# Patient Record
Sex: FEMALE | Race: OTHER
[De-identification: names, ages, dates, MRNs, and addresses within clinical notes are randomized per-mention and may not be internally consistent; named-entity substitution may affect disease eponyms.]

---

## 2017-07-12 ENCOUNTER — HOSPITAL ENCOUNTER (OUTPATIENT)
Dept: HOSPITAL 47 - RADMAMWWP | Age: 67
Discharge: HOME | End: 2017-07-12
Payer: MEDICARE

## 2017-07-12 DIAGNOSIS — Z12.31: Primary | ICD-10-CM

## 2017-07-12 DIAGNOSIS — R92.2: ICD-10-CM

## 2017-07-12 PROCEDURE — 77063 BREAST TOMOSYNTHESIS BI: CPT

## 2017-07-17 NOTE — MM
Reason for exam: additional evaluation requested from abnormal screening.

Last mammogram was performed 6 years ago.



History:

Patient is postmenopausal and is nulliparous.



Physical Findings:

Nurse did not find any significant physical abnormalities on exam.



MG 3D Screening Mammo W/Cad

Bilateral CC and MLO view(s) were taken.

Prior study comparison: July 27, 2011, mammogram, performed at [facility].

There are scattered fibroglandular densities.  There is chronic nodularity 

bilaterally. New calcifications associated with a nodule on each side on the right

posteriorly and centrally and on the left at 2o'clock posteriorly. Early popcorn 

or other benign type calcifications are favored.





ASSESSMENT: Incomplete: need additional imaging evaluation, BI-RAD 0



RECOMMENDATION:

Special view mammogram of both breasts.



If lesion persists on supplemental views, image directed ultrasound is 

recommended.



[facility] will attempt to contact patient to return for supplemental 

views and ultrasound if indicated.

## 2017-08-02 ENCOUNTER — HOSPITAL ENCOUNTER (OUTPATIENT)
Dept: HOSPITAL 47 - RADMAMWWP | Age: 67
Discharge: HOME | End: 2017-08-02
Payer: OTHER GOVERNMENT

## 2017-08-02 DIAGNOSIS — R92.8: Primary | ICD-10-CM

## 2017-08-02 NOTE — MM
Reason for exam: additional evaluation requested from abnormal screening.

Last mammogram was performed 1 month ago.



History:

Patient is postmenopausal and is nulliparous.



Physical Findings:

Nurse did not find any significant physical abnormalities on exam.



MG Work Up Mamm w CAD BILAT

Bilateral ML, CC with magnification, and ML with magnification view(s) were taken.

LM and LM with magnification view(s) were taken of the right breast.

Prior study comparison: July 12, 2017, bilateral MG 3d screening mammo w/cad.  

July 27, 2011, mammogram, performed at [facility].

The breast tissue is heterogeneously dense. This may lower the sensitivity of 

mammography.  The left breast calcifications layer on true lateral compatable with

benign milk of calcium. The right breast calcifications in question are similar 

to other calcifications through out the breast, rounded and probably benign. A 6 

month follow up is recommended.



These results were verbally communicated with the patient and result sheet given 

to the patient on 08/02/17.





ASSESSMENT: Probably benign, BI-RAD 3



RECOMMENDATION:

Follow-up diagnostic mammogram of the right breast in 6 months.

## 2018-05-07 ENCOUNTER — HOSPITAL ENCOUNTER (OUTPATIENT)
Dept: HOSPITAL 47 - RADMAMWWP | Age: 68
Discharge: HOME | End: 2018-05-07
Payer: OTHER GOVERNMENT

## 2018-05-07 DIAGNOSIS — R92.1: Primary | ICD-10-CM

## 2018-05-07 PROCEDURE — 77065 DX MAMMO INCL CAD UNI: CPT

## 2019-07-01 ENCOUNTER — HOSPITAL ENCOUNTER (OUTPATIENT)
Dept: HOSPITAL 47 - RADMAMWWP | Age: 69
Discharge: HOME | End: 2019-07-01
Attending: CLINIC/CENTER
Payer: COMMERCIAL

## 2019-07-01 DIAGNOSIS — R92.1: Primary | ICD-10-CM

## 2019-07-01 PROCEDURE — 77066 DX MAMMO INCL CAD BI: CPT

## 2019-07-01 NOTE — MM
Reason for exam: additional evaluation requested from prior study.

Last mammogram was performed 1 year and 2 months ago.



History:

Patient is postmenopausal and is nulliparous.



Physical Findings:

Nurse did not find any significant physical abnormalities on exam.



MG Diagnostic Mammo w CAD MARVA

Bilateral CC and MLO view(s) were taken.

Prior study comparison: May 7, 2018, right breast MG diagnostic mammo RT w CAD.  

August 2, 2017, bilateral MG work up mamm w CAD BILAT.

The breast tissue is heterogeneously dense. This may lower the sensitivity of 

mammography.  Benign appearing stable bilateral calcifications. There is chronic 

nodularity bilaterally.

No significant new findings when compared with previous films.



These results were verbally communicated with the patient and result sheet given 

to the patient on 7/1/19.





ASSESSMENT: Benign, BI-RAD 2



RECOMMENDATION:

Routine screening mammogram of both breasts in 1 year.

## 2020-07-14 ENCOUNTER — HOSPITAL ENCOUNTER (OUTPATIENT)
Dept: HOSPITAL 47 - RADMAMWWP | Age: 70
Discharge: HOME | End: 2020-07-14
Attending: CLINIC/CENTER
Payer: OTHER GOVERNMENT

## 2020-07-14 DIAGNOSIS — Z12.31: Primary | ICD-10-CM

## 2020-07-14 PROCEDURE — 77063 BREAST TOMOSYNTHESIS BI: CPT

## 2020-07-14 PROCEDURE — 77067 SCR MAMMO BI INCL CAD: CPT

## 2020-07-15 NOTE — MM
Reason for exam: screening  (asymptomatic).

Last mammogram was performed 1 year ago.



History:

Patient is postmenopausal and is nulliparous.



Physical Findings:

A clinical breast exam by your physician is recommended on an annual basis and 

results should be correlated with mammographic findings.



MG 3D Screening Mammo W/Cad

Bilateral CC and MLO view(s) were taken.

Prior study comparison: July 1, 2019, bilateral MG diagnostic mammo w CAD MARVA.  

May 7, 2018, right breast MG diagnostic mammo RT w CAD.

There are scattered fibroglandular densities.  There is chronic nodularity 

bilaterally.  No significant changes when compared with prior studies.





ASSESSMENT: Benign, BI-RAD 2



RECOMMENDATION:

Routine screening mammogram of both breasts in 1 year.

## 2021-07-16 ENCOUNTER — HOSPITAL ENCOUNTER (OUTPATIENT)
Dept: HOSPITAL 47 - RADMAMWWP | Age: 71
Discharge: HOME | End: 2021-07-16
Attending: FAMILY MEDICINE
Payer: OTHER GOVERNMENT

## 2021-07-16 DIAGNOSIS — Z12.31: Primary | ICD-10-CM

## 2021-07-16 DIAGNOSIS — Z78.0: ICD-10-CM

## 2021-07-16 PROCEDURE — 77067 SCR MAMMO BI INCL CAD: CPT

## 2021-07-16 PROCEDURE — 77063 BREAST TOMOSYNTHESIS BI: CPT

## 2021-07-20 NOTE — MM
Reason for exam: screening  (asymptomatic).

Last mammogram was performed 1 year ago.



History:

Patient is postmenopausal and is nulliparous.



Physical Findings:

A clinical breast exam by your physician is recommended on an annual basis and 

results should be correlated with mammographic findings.



MG 3D Screening Mammo W/Cad

Bilateral CC and MLO view(s) were taken.  XCCL view(s) were taken of the right 

breast.

Prior study comparison: July 14, 2020, bilateral MG 3d screening mammo w/cad.  

July 1, 2019, bilateral MG diagnostic mammo w CAD MARVA.

There are scattered fibroglandular densities.  Stable calcifications. There is 

chronic nodularity bilaterally.  No significant changes when compared with prior 

studies.





ASSESSMENT: Benign, BI-RAD 2



RECOMMENDATION:

Routine screening mammogram of both breasts in 1 year.

## 2022-02-16 ENCOUNTER — HOSPITAL ENCOUNTER (INPATIENT)
Dept: HOSPITAL 47 - EC | Age: 72
LOS: 6 days | Discharge: HOME | DRG: 190 | End: 2022-02-22
Attending: HOSPITALIST | Admitting: HOSPITALIST
Payer: OTHER GOVERNMENT

## 2022-02-16 DIAGNOSIS — K21.9: ICD-10-CM

## 2022-02-16 DIAGNOSIS — J44.1: Primary | ICD-10-CM

## 2022-02-16 DIAGNOSIS — Z79.83: ICD-10-CM

## 2022-02-16 DIAGNOSIS — Z87.39: ICD-10-CM

## 2022-02-16 DIAGNOSIS — Z79.51: ICD-10-CM

## 2022-02-16 DIAGNOSIS — Z20.822: ICD-10-CM

## 2022-02-16 DIAGNOSIS — I11.9: ICD-10-CM

## 2022-02-16 DIAGNOSIS — H40.9: ICD-10-CM

## 2022-02-16 DIAGNOSIS — Z98.890: ICD-10-CM

## 2022-02-16 DIAGNOSIS — Z90.49: ICD-10-CM

## 2022-02-16 DIAGNOSIS — E87.1: ICD-10-CM

## 2022-02-16 DIAGNOSIS — Z96.82: ICD-10-CM

## 2022-02-16 DIAGNOSIS — Z88.0: ICD-10-CM

## 2022-02-16 DIAGNOSIS — Z87.42: ICD-10-CM

## 2022-02-16 DIAGNOSIS — Z79.899: ICD-10-CM

## 2022-02-16 DIAGNOSIS — J96.21: ICD-10-CM

## 2022-02-16 DIAGNOSIS — Z87.440: ICD-10-CM

## 2022-02-16 DIAGNOSIS — M81.0: ICD-10-CM

## 2022-02-16 DIAGNOSIS — Z86.39: ICD-10-CM

## 2022-02-16 DIAGNOSIS — F32.A: ICD-10-CM

## 2022-02-16 DIAGNOSIS — I24.8: ICD-10-CM

## 2022-02-16 DIAGNOSIS — R32: ICD-10-CM

## 2022-02-16 DIAGNOSIS — G47.33: ICD-10-CM

## 2022-02-16 DIAGNOSIS — Z87.19: ICD-10-CM

## 2022-02-16 DIAGNOSIS — F41.9: ICD-10-CM

## 2022-02-16 DIAGNOSIS — I45.2: ICD-10-CM

## 2022-02-16 DIAGNOSIS — Z87.01: ICD-10-CM

## 2022-02-16 DIAGNOSIS — E66.01: ICD-10-CM

## 2022-02-16 DIAGNOSIS — E78.5: ICD-10-CM

## 2022-02-16 DIAGNOSIS — R33.9: ICD-10-CM

## 2022-02-16 DIAGNOSIS — Z88.8: ICD-10-CM

## 2022-02-16 DIAGNOSIS — Z90.710: ICD-10-CM

## 2022-02-16 DIAGNOSIS — R77.8: ICD-10-CM

## 2022-02-16 DIAGNOSIS — Z91.19: ICD-10-CM

## 2022-02-16 LAB
ALBUMIN SERPL-MCNC: 3.8 G/DL (ref 3.5–5)
ALP SERPL-CCNC: 103 U/L (ref 38–126)
ALT SERPL-CCNC: 15 U/L (ref 4–34)
ANION GAP SERPL CALC-SCNC: 10 MMOL/L
AST SERPL-CCNC: 24 U/L (ref 14–36)
BASOPHILS # BLD AUTO: 0.1 K/UL (ref 0–0.2)
BASOPHILS NFR BLD AUTO: 0 %
BUN SERPL-SCNC: 14 MG/DL (ref 7–17)
CALCIUM SPEC-MCNC: 8.8 MG/DL (ref 8.4–10.2)
CHLORIDE SERPL-SCNC: 95 MMOL/L (ref 98–107)
CO2 SERPL-SCNC: 24 MMOL/L (ref 22–30)
EOSINOPHIL # BLD AUTO: 0.1 K/UL (ref 0–0.7)
EOSINOPHIL NFR BLD AUTO: 1 %
ERYTHROCYTE [DISTWIDTH] IN BLOOD BY AUTOMATED COUNT: 4.52 M/UL (ref 3.8–5.4)
ERYTHROCYTE [DISTWIDTH] IN BLOOD: 14.4 % (ref 11.5–15.5)
GLUCOSE BLD-MCNC: 172 MG/DL (ref 75–99)
GLUCOSE BLD-MCNC: 214 MG/DL (ref 75–99)
GLUCOSE SERPL-MCNC: 175 MG/DL (ref 74–99)
HCT VFR BLD AUTO: 42.2 % (ref 34–46)
HGB BLD-MCNC: 14 GM/DL (ref 11.4–16)
LYMPHOCYTES # SPEC AUTO: 0.9 K/UL (ref 1–4.8)
LYMPHOCYTES NFR SPEC AUTO: 6 %
MCH RBC QN AUTO: 31 PG (ref 25–35)
MCHC RBC AUTO-ENTMCNC: 33.2 G/DL (ref 31–37)
MCV RBC AUTO: 93.4 FL (ref 80–100)
MONOCYTES # BLD AUTO: 0.4 K/UL (ref 0–1)
MONOCYTES NFR BLD AUTO: 3 %
NEUTROPHILS # BLD AUTO: 14.2 K/UL (ref 1.3–7.7)
NEUTROPHILS NFR BLD AUTO: 90 %
PLATELET # BLD AUTO: 245 K/UL (ref 150–450)
POTASSIUM SERPL-SCNC: 3.5 MMOL/L (ref 3.5–5.1)
PROT SERPL-MCNC: 6.9 G/DL (ref 6.3–8.2)
SODIUM SERPL-SCNC: 129 MMOL/L (ref 137–145)
WBC # BLD AUTO: 15.8 K/UL (ref 3.8–10.6)

## 2022-02-16 PROCEDURE — 96375 TX/PRO/DX INJ NEW DRUG ADDON: CPT

## 2022-02-16 PROCEDURE — 80061 LIPID PANEL: CPT

## 2022-02-16 PROCEDURE — 74018 RADEX ABDOMEN 1 VIEW: CPT

## 2022-02-16 PROCEDURE — 87635 SARS-COV-2 COVID-19 AMP PRB: CPT

## 2022-02-16 PROCEDURE — 84145 PROCALCITONIN (PCT): CPT

## 2022-02-16 PROCEDURE — 83036 HEMOGLOBIN GLYCOSYLATED A1C: CPT

## 2022-02-16 PROCEDURE — 71045 X-RAY EXAM CHEST 1 VIEW: CPT

## 2022-02-16 PROCEDURE — 83880 ASSAY OF NATRIURETIC PEPTIDE: CPT

## 2022-02-16 PROCEDURE — 83735 ASSAY OF MAGNESIUM: CPT

## 2022-02-16 PROCEDURE — 83605 ASSAY OF LACTIC ACID: CPT

## 2022-02-16 PROCEDURE — 94760 N-INVAS EAR/PLS OXIMETRY 1: CPT

## 2022-02-16 PROCEDURE — 80048 BASIC METABOLIC PNL TOTAL CA: CPT

## 2022-02-16 PROCEDURE — 93005 ELECTROCARDIOGRAM TRACING: CPT

## 2022-02-16 PROCEDURE — 84484 ASSAY OF TROPONIN QUANT: CPT

## 2022-02-16 PROCEDURE — 36415 COLL VENOUS BLD VENIPUNCTURE: CPT

## 2022-02-16 PROCEDURE — 80053 COMPREHEN METABOLIC PANEL: CPT

## 2022-02-16 PROCEDURE — 96374 THER/PROPH/DIAG INJ IV PUSH: CPT

## 2022-02-16 PROCEDURE — 71275 CT ANGIOGRAPHY CHEST: CPT

## 2022-02-16 PROCEDURE — 96372 THER/PROPH/DIAG INJ SC/IM: CPT

## 2022-02-16 PROCEDURE — 94660 CPAP INITIATION&MGMT: CPT

## 2022-02-16 PROCEDURE — 94640 AIRWAY INHALATION TREATMENT: CPT

## 2022-02-16 PROCEDURE — 99285 EMERGENCY DEPT VISIT HI MDM: CPT

## 2022-02-16 PROCEDURE — 85025 COMPLETE CBC W/AUTO DIFF WBC: CPT

## 2022-02-16 PROCEDURE — 93306 TTE W/DOPPLER COMPLETE: CPT

## 2022-02-16 PROCEDURE — 5A09357 ASSISTANCE WITH RESPIRATORY VENTILATION, LESS THAN 24 CONSECUTIVE HOURS, CONTINUOUS POSITIVE AIRWAY PRESSURE: ICD-10-PCS

## 2022-02-16 RX ADMIN — ISODIUM CHLORIDE SCH MG: 0.03 SOLUTION RESPIRATORY (INHALATION) at 15:31

## 2022-02-16 RX ADMIN — METHYLPREDNISOLONE SODIUM SUCCINATE SCH MG: 125 INJECTION, POWDER, FOR SOLUTION INTRAMUSCULAR; INTRAVENOUS at 17:07

## 2022-02-16 RX ADMIN — ASPIRIN 81 MG CHEWABLE TABLET SCH MG: 81 TABLET CHEWABLE at 08:42

## 2022-02-16 RX ADMIN — INSULIN ASPART SCH UNIT: 100 INJECTION, SOLUTION INTRAVENOUS; SUBCUTANEOUS at 20:58

## 2022-02-16 RX ADMIN — INSULIN ASPART SCH UNIT: 100 INJECTION, SOLUTION INTRAVENOUS; SUBCUTANEOUS at 17:08

## 2022-02-16 RX ADMIN — IPRATROPIUM BROMIDE SCH MG: 0.5 SOLUTION RESPIRATORY (INHALATION) at 20:24

## 2022-02-16 RX ADMIN — IPRATROPIUM BROMIDE SCH MG: 0.5 SOLUTION RESPIRATORY (INHALATION) at 15:31

## 2022-02-16 RX ADMIN — IPRATROPIUM BROMIDE SCH MG: 0.5 SOLUTION RESPIRATORY (INHALATION) at 11:32

## 2022-02-16 RX ADMIN — BUDESONIDE SCH MG: 0.5 INHALANT ORAL at 20:24

## 2022-02-16 RX ADMIN — CEFAZOLIN SCH MLS/HR: 330 INJECTION, POWDER, FOR SOLUTION INTRAMUSCULAR; INTRAVENOUS at 17:07

## 2022-02-16 RX ADMIN — ISODIUM CHLORIDE SCH MG: 0.03 SOLUTION RESPIRATORY (INHALATION) at 11:33

## 2022-02-16 RX ADMIN — IPRATROPIUM BROMIDE SCH: 0.5 SOLUTION RESPIRATORY (INHALATION) at 11:32

## 2022-02-16 RX ADMIN — CEFAZOLIN SCH: 330 INJECTION, POWDER, FOR SOLUTION INTRAMUSCULAR; INTRAVENOUS at 21:01

## 2022-02-16 RX ADMIN — METHYLPREDNISOLONE SODIUM SUCCINATE SCH MG: 125 INJECTION, POWDER, FOR SOLUTION INTRAMUSCULAR; INTRAVENOUS at 08:43

## 2022-02-16 RX ADMIN — ISODIUM CHLORIDE SCH MG: 0.03 SOLUTION RESPIRATORY (INHALATION) at 20:24

## 2022-02-16 RX ADMIN — METHYLPREDNISOLONE SODIUM SUCCINATE SCH MG: 125 INJECTION, POWDER, FOR SOLUTION INTRAMUSCULAR; INTRAVENOUS at 23:47

## 2022-02-16 RX ADMIN — HEPARIN SODIUM SCH UNIT: 5000 INJECTION INTRAVENOUS; SUBCUTANEOUS at 08:44

## 2022-02-16 RX ADMIN — AZITHROMYCIN SCH MG: 500 TABLET, FILM COATED ORAL at 08:42

## 2022-02-16 RX ADMIN — HEPARIN SODIUM SCH UNIT: 5000 INJECTION INTRAVENOUS; SUBCUTANEOUS at 20:58

## 2022-02-16 RX ADMIN — ISODIUM CHLORIDE SCH: 0.03 SOLUTION RESPIRATORY (INHALATION) at 11:32

## 2022-02-16 RX ADMIN — BUDESONIDE SCH MG: 0.5 INHALANT ORAL at 11:32

## 2022-02-16 NOTE — ECHOF
Referral Reason:



MEASUREMENTS

--------

HEIGHT: 154.9 cm

WEIGHT: 103.9 kg

BP: 

RVIDd:   3.1 cm     (< 3.3)

IVSd:   1.2 cm     (0.6 - 1.1)

LVIDd:   4.1 cm     (3.9 - 5.3)

LVPWd:   1.5 cm     (0.6 - 1.1)

IVSs:   1.9 cm

LVIDs:   2.5 cm

LVPWs:   1.9 cm

Ao Diam:   2.8 cm     (2.0 - 3.7)

AV Cusp:   2.0 cm     (1.5 - 2.6)

LA Diam:   3.4 cm     (2.7 - 3.8)

MV EXCURSION:   13.536 mm     (> 18.000)

MV EF SLOPE:   63 mm/s     (70 - 150)

EPSS:   0.4 cm

MV E Ministerio:   0.47 m/s

MV DecT:   96 ms

MV A Ministerio:   0.69 m/s

MV E/A Ratio:   0.68 

RAP:   5.00 mmHg

RVSP:   13.19 mmHg







FINDINGS

--------

Sinus rhythm.

This was a technically difficult study with suboptimal apical views.

The left ventricular size is normal.   There is mild concentric left ventricular hypertrophy.   Overa
ll left ventricular systolic function is normal with, an EF between 55 - 60 %.

The right ventricle is normal in size.

The left atrial size is normal.

The right atrial size is normal.

The aortic valve is trileaflet and appears structurally normal.

The mitral valve is normal.   There is trace mitral regurgitation.

The tricuspid valve appears structurally normal.   Trace tricuspid regurgitation present.   Right eliana
tricular systolic pressure is normal at < 35 mmHg.

There is no pulmonic regurgitation present.

The aortic root size is normal.

IVC Not well visulized.

There is no pericardial effusion.



CONCLUSIONS

--------

1. There is mild concentric left ventricular hypertrophy.

2. Overall left ventricular systolic function is normal with, an EF between 55 - 60 %.

3. There is trace mitral regurgitation.

4. Trace tricuspid regurgitation present.

5. There is no pericardial effusion.





SONOGRAPHER: Preethi Womack [facility]

## 2022-02-16 NOTE — XR
EXAMINATION TYPE: XR chest 1V portable

 

DATE OF EXAM: 2/16/2022

 

COMPARISON: NONE

 

HISTORY: Dyspnea.

 

TECHNIQUE: Single AP portable frontal upright view of the chest is obtained.

 

FINDINGS:  There is chronic parenchymal change without suspicious focal air space opacity, pleural ef
fusion, or pneumothorax seen. Cardiomegaly is present.   The osseous structures are intact. Overlying
 EKG leads.

 

IMPRESSION:  Chronic parenchymal changes and cardiomegaly without acute pulmonary process.

## 2022-02-16 NOTE — P.HPIM
History of Present Illness





This is a pleasant 71 years old female with past medical history of hypertension

and hypothyroidism


Presents because of dyspnea and hypoxia, and EMS was saturation of 70% on room 

air and placed supine with CPAP and possible saturation up to 94% and received 1

dose of steroids.


Patient came from home, she has a legal guardian.  Patient states that her 

problems started with a cold over the weekend and to get worse but later on she 

said that her shortness of breath actually started last Monday and it was 

gradually worse and that she is on home dose of oxygen 5 L/m as she thinks.  

Also she is complaining of from cough and some creamy phlegm but no chest pain 

or abdominal pain.  No diarrhea or vomiting.  No urinary complaints, she has 

some mild headache which is improving, no weakness or numbness or double vision 

or slurred speech.





She was hypoxic on the way to the hospital at 70% on room air, currently she is 

on BiPAP was saturating 98%, mildly tachypneic at 20, afebrile.


Troponin elevated 0.05.


 chest x-ray: Chronic parenchymal changes and cardiomegaly without acute 

pulmonary process. proBNP is 801


EKG showing right bundle branch block and left anterior fascicular block with 

.


In the emergency room she was started on Zithromax, IV Solu-Medrol and normal 

saline at 100 mL per hour


Pulmonary team were consulted














Review of Systems





CONSTITUTIONAL: No fever, no malaise, no fatigue. 


HEENT: No recent visual problems or hearing problems. Denied any sore throat. 


CARDIOVASCULAR: No  orthopnea, PND, no palpitations, no syncope. 


PULMONARY: No shortness of breath, no cough, no hemoptysis. 


GASTROINTESTINAL: No diarrhea, no nausea, no vomiting, no abdominal pain. 

Normoactive bowel sounds. 


NEUROLOGICAL: No headaches, no weakness, no numbness. 


HEMATOLOGICAL: Denies any bleeding or petechiae. 


GENITOURINARY: Denies any burning micturition, frequency, or urgency. 


MUSCULOSKELETAL/RHEUMATOLOGICAL: Denies any joint pain, swelling, or any muscle 

pain. 


ENDOCRINE: Denies any polyuria or polydipsia.











Past Medical History


Past Medical History: Hypertension, Thyroid Disorder


History of Any Multi-Drug Resistant Organisms: None Reported


Past Surgical History: Cholecystectomy, Hysterectomy


Past Psychological History: Anxiety, Depression


Smoking Status: Never smoker


Past Alcohol Use History: None Reported


Past Drug Use History: None Reported





Medications and Allergies


                                    Allergies











Allergy/AdvReac Type Severity Reaction Status Date / Time


 


paliperidone [From Invega] Allergy  Unknown Verified 02/16/22 06:42


 


Penicillins Allergy  Unknown Verified 02/16/22 06:42














Physical Exam


Vitals: 


                                   Vital Signs











  Temp Pulse Resp BP Pulse Ox


 


 02/16/22 07:48  97.8 F  96  20  129/67  99


 


 02/16/22 07:26   85   


 


 02/16/22 07:16   83   


 


 02/16/22 06:34  97.7 F  85  28 H  155/90  98








                                Intake and Output











 02/15/22 02/16/22 02/16/22





 22:59 06:59 14:59


 


Other:   


 


  Weight  103.873 kg 














-GENERAL: The patient is alert and oriented x3, not in any acute distress.  

Obese


HEENT: Pupils are round and equally reacting to light. EOMI. No scleral icterus.

No conjunctival pallor. Normocephalic, atraumatic. No pharyngeal erythema. No 

thyromegaly. 


CARDIOVASCULAR: S1 and S2 present. No murmurs, rubs, or gallops. 


-PULMONARY: Chest is clear to auscultation, no wheezing or crackles.  Mildly 

thick and neck, on BiPAP


ABDOMEN: Soft, nontender, nondistended, normoactive bowel sounds. No palpable 

organomegaly. 


MUSCULOSKELETAL: No joint swelling or deformity. 


EXTREMITIES: No cyanosis, clubbing, or pedal edema. 


NEUROLOGICAL: Gross neurological examination did not reveal any focal deficits. 


SKIN: No rashes. No petechiae











Results


CBC & Chem 7: 


                                 02/16/22 06:25





                                 02/16/22 06:25


Labs: 


                  Abnormal Lab Results - Last 24 Hours (Table)











  02/16/22 02/16/22 02/16/22 Range/Units





  06:25 06:25 06:25 


 


WBC  15.8 H    (3.8-10.6)  k/uL


 


Neutrophils #  14.2 H    (1.3-7.7)  k/uL


 


Lymphocytes #  0.9 L    (1.0-4.8)  k/uL


 


Sodium   129 L   (137-145)  mmol/L


 


Chloride   95 L   ()  mmol/L


 


Glucose   175 H   (74-99)  mg/dL


 


Troponin I    0.050 H*  (0.000-0.034)  ng/mL














Assessment and Plan


Assessment: 








shortness of breath and hypoxia


Possible acute COPD exacerbation


Elevated troponin, rule out cardiac causes


Acute on chronic hypoxic respiratory failure


Hyponatremia


Hypertension


History of hyperthyroidism


Obesity with BMI of 44.7








Plan: 





this is a pleasant 71 years old female who presents with dyspnea and elevated 

troponin


Continue with steroids and Zithromax.  Since it culture.  Check Pro-calcitonin 


Continue with oxygen supply per requirement, and BiPAP as needed


Pulmonary consult


We'll add aspirin 81 mg, consult cardiology 


check echo cardiogram 


Check hemoglobin A1c and lipid profile


Labs and medication were reviewed..  Continue same treatment.  Continue with 

symptomatic treatment.  Resume home medication.  Monitor lytes and vitals.  DVT 

and GI prophylaxis.  Further recommendations depends on the clinical course of 

the patient


DVT prophylaxis: Subcutaneous heparin


GI Prophylaxis: Pepcid


PT/OT: Deferred


Prognosis is guarded

## 2022-02-16 NOTE — CT
EXAMINATION TYPE: CT angio chest

 

DATE OF EXAM: 2/16/2022

 

COMPARISON: 2/16/2022

 

HISTORY: COPD Exacerbation

 

CT DLP: 391.9 mGycm

 

CONTRAST: 

CT chest with contrast and 3D reconstruction with MIP imaging is performed with IV Contrast, patient 
injected with 79 mL of Isovue 370.

 

Contrast-enhanced CT of the chest was performed through the course of the pulmonary arteries with donald
g and mediastinal window settings submitted.  3D reconstruction with MIP imaging was also performed.

 

PULMONARY ARTERIES:  The pulmonary arteries and their major tributaries are patent.  I do not see usha
dence for sizable filling defect to suggest pulmonary embolic process. 

 

LUNGS: The lungs are clear and free of infiltrate. No evidence for atelectasis.   No pulmonary nodule
 or mass is detected.  No pleural effusion.  

 

MEDIASTINUM: Thoracic aorta is of normal caliber. There is an aberrant right subclavian artery noted.
 The heart is not enlarged.  No evidence for mediastinal mass.  No mediastinal lymph nodes greater th
an 1cm.

 

HILAR STRUCTURES: No evidence for mass.  No hilar lymph nodes greater than 1 cm.

 

UPPER ABDOMEN:  No significant abnormality is seen.

 

IMPRESSION:

1.  No evidence for Pulmonary embolism at this time.

## 2022-02-16 NOTE — P.CNPUL
History of Present Illness


Consult date: 02/16/22


Requesting physician: [facility]


Reason for consult: dyspnea, hypoxemia


Chief complaint: Shortness of breath.


History of present illness: 





Pulmonary consult dated 02/16/2022.





71-year-old female brought in from the adult foster care home, for shortness of 

breath.  She was seen in the emergency room this morning.  The patient complains

of shortness of breath, which has been going on for at least a day or so prior 

to admission, may be a bit longer.  She is not a good historian, and she is 

currently on BiPAP which makes communication and history taking a bit difficult.

 She denies any fever or chills.  She does have a bit of a cough but she is not 

producing any phlegm.  There is no chest pain or chest discomfort.  No nausea, 

vomiting, or diarrhea.  No leg swelling or edema.  Currently, the patient's on 

BiPAP at 12/6 and 100%.  The patient's getting saline at KVO.  Her coronavirus 

testing was negative.  Her most recent vital signs include a blood pressure 

135/72, heart rate 84 respiratory rate 18-20 breaths per minute, and a 

saturation of 99%.  Her chest x-ray showed only chronic changes, with nothing 

acute.  White count 15.8, hemoglobin 14, hematocrit 42.2, platelet count 2 45

,000.  Sodium 129, potassium 3.5, chlorides 95, CO2 24, anion gap 10, BUN 14, 

creatinine 0.8.  Troponin is 0.050.  N-terminal proBNP is 801.  Albumin is 3.8. 

Chest x-ray looks mostly clear.





Review of Systems





REVIEW OF SYSTEMS:  


CONSTITUTIONAL:  [Negative.]


NEUROLOGIC: [ Negative.]


HEENT:  [ Negative.]


CARDIAC:  [Negative.]


PULMONARY: Shortness of breath, and minimal nonproductive cough.


GI:  [Negative.]


:  [Negative.]


RHEUMATOLOGIC: [ Negative.]


IMMUNOLOGIC: [ Negative.]


ENDOCRINE:  [Negative.  ] 


DERMATOLOGIC: [Negative.]








Past Medical History


Past Medical History: COPD, Eye Disorder, GERD/Reflux, Hypertension, Pneumonia, 

Sleep Apnea/CPAP/BIPAP, Thyroid Disorder


Additional Past Medical History / Comment(s): Urinary incontinence/pt has 

bladder stimulator in place, UTIs, JEAN with Cpap device, bilateral glaucoma, 

bronchitis, bilateral leg/pedal edema,


History of Any Multi-Drug Resistant Organisms: None Reported


Past Surgical History: Cholecystectomy, Hysterectomy, Orthopedic Surgery


Additional Past Surgical History / Comment(s): Bladder stimulator, bilateral 

feet bunionectomies, colonoscopy


Past Anesthesia/Blood Transfusion Reactions: No Reported Reaction


Smoking Status: Never smoker





- Past Family History


  ** Father


Additional Family Medical History / Comment(s): Respiratory problems





  ** Mother


Additional Family Medical History / Comment(s): Heart problems.





Medications and Allergies


                                Home Medications











 Medication  Instructions  Recorded  Confirmed  Type


 


Acetaminophen Tab [Tylenol Tab] 500 mg PO Q4H PRN MDD 6 TABS 02/16/22 02/16/22 

History


 


Albuterol Sulfate [Albuterol 2 puff PO RT-Q8H PRN 02/16/22 02/16/22 History





Sulfate Hfa]    


 


Alendronate Sodium 70 mg PO Q7D 02/16/22 02/16/22 History


 


Calcium Carbonate/Vitamin D3 1 tab PO BID 02/16/22 02/16/22 History





[Calcium 500 mg-Vit D3 5 mcg (200    





Unit)]    


 


Carboxymethylcell/Glycerin/Pf 1 drop BOTH EYES QID 02/16/22 02/16/22 History





[Refresh Relieva Pf 0.5-1% Drop]    


 


DULoxetine HCL [Cymbalta] 60 mg PO DAILY 02/16/22 02/16/22 History


 


Ergocalciferol [Vitamin D2 (1250 1,250 mcg PO Q14D 02/16/22 02/16/22 History





Mcg = 14098 Iu)]    


 


Felodipine [Felodipine ER] 10 mg PO BID 02/16/22 02/16/22 History


 


Fluticasone/Salmeterol [Advair 1 inhalation PO RT-BID 02/16/22 02/16/22 History





100-50 Diskus]    


 


Furosemide [Lasix] 10 mg PO DAILY 02/16/22 02/16/22 History


 


Ipratropium/Albuter 20-100Mcg 1 puff INHALATION RT-QID PRN 02/16/22 02/16/22 

History





[Combivent Respimat 20-100Mcg    





Inhaler]    


 


Latanoprost [Xalatan 0.005%] 1 drop BOTH EYES HS 02/16/22 02/16/22 History


 


Lisinopril-Hctz 20-25 mg 1 tab PO DAILY 02/16/22 02/16/22 History





[Zestoretic 20-25]    


 


Metoprolol Tartrate [Lopressor] 50 mg PO BID 02/16/22 02/16/22 History


 


Omeprazole [PriLOSEC] 40 mg PO DAILY 02/16/22 02/16/22 History


 


Oxybutynin Chloride [Ditropan] 5 mg PO TID 02/16/22 02/16/22 History


 


Potassium Chloride [Klor-Con 10 ER] 10 meq PO Q48H 02/16/22 02/16/22 History


 


QUEtiapine FUMARATE [SEROquel] 300 mg PO HS 02/16/22 02/16/22 History


 


Simvastatin [Zocor] 40 mg PO HS 02/16/22 02/16/22 History


 


lamoTRIgine [LaMICtal] 100 mg PO BID 02/16/22 02/16/22 History


 


lamoTRIgine [LaMICtal] 200 mg PO BID 02/16/22 02/16/22 History








                                    Allergies











Allergy/AdvReac Type Severity Reaction Status Date / Time


 


paliperidone [From Invega] Allergy  swelling Verified 02/16/22 08:50





   of tongue  


 


Penicillins Allergy  Rash/Hives Verified 02/16/22 08:50














Physical Exam


Osteopathic Statement: *.  No significant issues noted on an osteopathic 

structural exam other than those noted in the History and Physical/Consult.


Vitals: 


                                   Vital Signs











  Temp Pulse Resp BP Pulse Ox


 


 02/16/22 09:14   82  20  126/80  98


 


 02/16/22 07:48  97.8 F  96  20  129/67  99


 


 02/16/22 07:26   85   


 


 02/16/22 07:16   83   


 


 02/16/22 06:34  97.7 F  85  28 H  155/90  98








                                Intake and Output











 02/15/22 02/16/22 02/16/22





 22:59 06:59 14:59


 


Other:   


 


  Weight  103.873 kg 103.873 kg














Mild conversational dyspnea, BiPAP mask in place.  Oriented 3.





HEENT examination is grossly unremarkable. 





Neck supple.  Full range of motion.  No adenopathy thyromegaly or neck vein 

distention.





Cardiovascular examination reveals regular rhythm rate.  S1-S2 normal.  No S3 or

S4.  No discernible murmur noted.  Heart rate 82 bpm.





Lungs reveal mostly clear breath sounds.  Mild scattered rhonchi.  No wheezes or

crackles.  Breath sounds equal bilaterally.





Abdomen is soft.  Bowel sounds are noted.  No masses or tenderness.





Extremities are intact.  No cyanosis clubbing or edema.





Skin is without rash or lesion.





Neurologic examination is brief but nonfocal.





Results





- Laboratory Findings


CBC and BMP: 


                                 02/16/22 06:25





                                 02/16/22 06:25


Abnormal lab findings: 


                                  Abnormal Labs











  02/16/22 02/16/22 02/16/22





  06:25 06:25 06:25


 


WBC  15.8 H  


 


Neutrophils #  14.2 H  


 


Lymphocytes #  0.9 L  


 


Sodium   129 L 


 


Chloride   95 L 


 


Glucose   175 H 


 


Troponin I    0.050 H*














- Diagnostic Findings


Chest x-ray: image reviewed





Assessment and Plan


Assessment: 





Shortness of breath, of unclear etiology.  Pulmonary embolism should be ruled 

out, with a CT angiogram.





Questionable history of COPD.





History of hypertension.





History of hyperlipidemia.





History of gastroesophageal reflux disease.





History of anxiety/depression.





History of urinary incontinence.





History of osteoporosis.


Plan: 





Plan dated 02/16/2022.





The patient should be sent for a CT angiogram to rule out pulmonary embolism.  

There is a vague history of COPD although the patient apparently is a lifelong 

non-tobacco user.  She may have underlying asthma.  That does not appear to be 

particularly active at this time.  Additional recommendations and suggestions 

are forthcoming.  Prognosis is guarded.  We will continue to follow.  FiO2 can 

be weaned on the BiPAP device.


Time with Patient: Greater than 30

## 2022-02-16 NOTE — ED
SOB HPI





- General


Chief Complaint: Shortness of Breath


Stated Complaint: GREGORY


Time Seen by Provider: 02/16/22 06:23


Source: patient, EMS


Mode of arrival: EMS





- History of Present Illness


Initial Comments: 





This patient is 71-year-old woman brought from [facility] home for shortness of breath.

 [facility]  Patient's ability give history

slightly limited as she is very dyspneic and wearing BiPAP mask.  She is able to

give very short answers to questions.  The breathing has been worsening over the

past couple of days much worse overnight.  Patient denies fever or chills.  No 

productive cough.  No chest pain.  No change in urination or bowel movements.  

No leg swelling noted.


MD Complaint: shortness of breath, cough


-: days(s)


Severity scale (1-10): 0


Consistency: constant


Improves With: nothing


Worsens With: lying flat


Known History Of: COPD


Associated Symptoms: denies other symptoms


Treatments Prior to Arrival: oxygen, NIPPV





- Related Data


                                Home Medications











 Medication  Instructions  Recorded  Confirmed


 


Acetaminophen Tab [Tylenol] 500 mg PO Q4H PRN MDD 6 TABS 02/16/22 02/16/22


 


Alendronate Sodium 70 mg PO Q7D 02/16/22 02/16/22


 


Calcium Carbonate/Vitamin D3 1 tab PO BID 02/16/22 02/16/22





[Calcium 500 mg-Vit D3 5 mcg (200   





Unit)]   


 


Carboxymethylcell/Glycerin/Pf 1 drop BOTH EYES QID 02/16/22 02/16/22





[Refresh Relieva Pf 0.5-1% Drop]   


 


DULoxetine HCL [Cymbalta] 60 mg PO DAILY 02/16/22 02/16/22


 


Ergocalciferol [Vitamin D2 (1250 1,250 mcg PO Q14D 02/16/22 02/16/22





Mcg = 13361 Iu)]   


 


Felodipine [Felodipine ER] 10 mg PO BID 02/16/22 02/16/22


 


Fluticasone/Salmeterol [Advair 1 inhalation PO RT-BID 02/16/22 02/16/22





100-50 Diskus]   


 


Furosemide [Lasix] 10 mg PO DAILY 02/16/22 02/16/22


 


Ipratropium/Albuter 20-100Mcg 1 puff INHALATION RT-QID PRN 02/16/22 02/16/22





[Combivent Respimat 20-100Mcg   





Inhaler]   


 


Latanoprost [Xalatan 0.005%] 1 drop BOTH EYES HS 02/16/22 02/16/22


 


Lisinopril-Hctz 20-25 mg 1 tab PO DAILY 02/16/22 02/16/22





[Zestoretic 20-25]   


 


Metoprolol Tartrate [Lopressor] 50 mg PO BID 02/16/22 02/16/22


 


Omeprazole [PriLOSEC] 40 mg PO DAILY 02/16/22 02/16/22


 


Oxybutynin Chloride [Ditropan] 5 mg PO TID 02/16/22 02/16/22


 


Potassium Chloride [Klor-Con 10 ER] 10 meq PO Q48H 02/16/22 02/16/22


 


QUEtiapine FUMARATE [SEROquel] 300 mg PO HS 02/16/22 02/16/22


 


Simvastatin [Zocor] 40 mg PO HS 02/16/22 02/16/22


 


lamoTRIgine [LaMICtal] 100 mg PO BID 02/16/22 02/16/22


 


lamoTRIgine [LaMICtal] 200 mg PO BID 02/16/22 02/16/22








                                  Previous Rx's











 Medication  Instructions  Recorded


 


Albuterol Sulfate [Albuterol 2 puff PO RT-Q8H PRN #1 inh 02/22/22





Sulfate Hfa]  











                                    Allergies











Allergy/AdvReac Type Severity Reaction Status Date / Time


 


paliperidone [From Invega] Allergy  swelling Verified 02/16/22 08:50





   of tongue  


 


Penicillins Allergy  Rash/Hives Verified 02/16/22 08:50














Review of Systems


ROS Statement: 


Those systems with pertinent positive or pertinent negative responses have been 

documented in the HPI.





ROS Other: All systems not noted in ROS Statement are negative.


Limitations: ROS unobtainable due to patients medical condition


Constitutional: Denies: fever


Respiratory: Reports: cough, dyspnea


Cardiovascular: Denies: chest pain, edema


Gastrointestinal: Denies: abdominal pain, vomiting


Genitourinary: Denies: dysuria


Neurological: Denies: headache





Past Medical History


Past Medical History: Hypertension, Thyroid Disorder


History of Any Multi-Drug Resistant Organisms: None Reported


Past Surgical History: Cholecystectomy, Hysterectomy


Past Psychological History: Anxiety, Depression


Smoking Status: Never smoker


Past Alcohol Use History: None Reported


Past Drug Use History: None Reported





- Past Family History


  ** Father


Additional Family Medical History / Comment(s): Respiratory problems





  ** Mother


Additional Family Medical History / Comment(s): Heart problems.





General Exam


General appearance: alert, in distress


Head exam: Present: atraumatic, normocephalic


Eye exam: Present: normal appearance.  Absent: scleral icterus, conjunctival 

injection


Respiratory exam: Present: respiratory distress, wheezes, accessory muscle use, 

decreased breath sounds, prolonged expiratory.  Absent: rales, rhonchi, stridor


Cardiovascular Exam: Present: regular rate, normal rhythm, normal heart sounds. 

Absent: systolic murmur, diastolic murmur, rubs, gallop


GI/Abdominal exam: Present: soft.  Absent: distended, tenderness, guarding, 

rebound


Extremities exam: Present: normal inspection, normal capillary refill.  Absent: 

pedal edema, calf tenderness


Back exam: Present: normal inspection


Neurological exam: Present: alert


Skin exam: Present: warm, dry, intact, normal color.  Absent: rash





Course


                                   Vital Signs











  02/16/22 02/16/22 02/16/22





  06:34 07:16 07:26


 


Temperature 97.7 F  


 


Pulse Rate 85 83 85


 


Respiratory 28 H  





Rate   


 


Blood Pressure 155/90  


 


O2 Sat by Pulse 98  





Oximetry   














  02/16/22 02/16/22 02/16/22





  07:48 09:14 11:34


 


Temperature 97.8 F  


 


Pulse Rate 96 82 79


 


Respiratory 20 20 





Rate   


 


Blood Pressure 129/67 126/80 


 


O2 Sat by Pulse 99 98 





Oximetry   














  02/16/22 02/16/22





  11:48 14:00


 


Temperature  


 


Pulse Rate 83 85


 


Respiratory  16





Rate  


 


Blood Pressure  136/80


 


O2 Sat by Pulse  99





Oximetry  














Medical Decision Making





- Lab Data


Result diagrams: 


                                 02/20/22 05:05





                                 02/20/22 05:05


                                   Lab Results











  02/16/22 02/16/22 02/16/22 Range/Units





  06:25 06:25 06:25 


 


WBC  15.8 H    (3.8-10.6)  k/uL


 


RBC  4.52    (3.80-5.40)  m/uL


 


Hgb  14.0    (11.4-16.0)  gm/dL


 


Hct  42.2    (34.0-46.0)  %


 


MCV  93.4    (80.0-100.0)  fL


 


MCH  31.0    (25.0-35.0)  pg


 


MCHC  33.2    (31.0-37.0)  g/dL


 


RDW  14.4    (11.5-15.5)  %


 


Plt Count  245    (150-450)  k/uL


 


MPV  7.6    


 


Neutrophils %  90    %


 


Lymphocytes %  6    %


 


Monocytes %  3    %


 


Eosinophils %  1    %


 


Basophils %  0    %


 


Neutrophils #  14.2 H    (1.3-7.7)  k/uL


 


Lymphocytes #  0.9 L    (1.0-4.8)  k/uL


 


Monocytes #  0.4    (0-1.0)  k/uL


 


Eosinophils #  0.1    (0-0.7)  k/uL


 


Basophils #  0.1    (0-0.2)  k/uL


 


Sodium   129 L   (137-145)  mmol/L


 


Potassium   3.5   (3.5-5.1)  mmol/L


 


Chloride   95 L   ()  mmol/L


 


Carbon Dioxide   24   (22-30)  mmol/L


 


Anion Gap   10   mmol/L


 


BUN   14   (7-17)  mg/dL


 


Creatinine   0.80   (0.52-1.04)  mg/dL


 


Est GFR (CKD-EPI)AfAm   87   (>60 ml/min/1.73 sqM)  


 


Est GFR (CKD-EPI)NonAf   75   (>60 ml/min/1.73 sqM)  


 


Glucose   175 H   (74-99)  mg/dL


 


Plasma Lactic Acid Renzo    1.8  (0.7-2.0)  mmol/L


 


Calcium   8.8   (8.4-10.2)  mg/dL


 


Total Bilirubin   0.7   (0.2-1.3)  mg/dL


 


AST   24   (14-36)  U/L


 


ALT   15   (4-34)  U/L


 


Alkaline Phosphatase   103   ()  U/L


 


Troponin I     (0.000-0.034)  ng/mL


 


NT-Pro-B Natriuret Pep     pg/mL


 


Total Protein   6.9   (6.3-8.2)  g/dL


 


Albumin   3.8   (3.5-5.0)  g/dL














  02/16/22 02/16/22 Range/Units





  06:25 06:25 


 


WBC    (3.8-10.6)  k/uL


 


RBC    (3.80-5.40)  m/uL


 


Hgb    (11.4-16.0)  gm/dL


 


Hct    (34.0-46.0)  %


 


MCV    (80.0-100.0)  fL


 


MCH    (25.0-35.0)  pg


 


MCHC    (31.0-37.0)  g/dL


 


RDW    (11.5-15.5)  %


 


Plt Count    (150-450)  k/uL


 


MPV    


 


Neutrophils %    %


 


Lymphocytes %    %


 


Monocytes %    %


 


Eosinophils %    %


 


Basophils %    %


 


Neutrophils #    (1.3-7.7)  k/uL


 


Lymphocytes #    (1.0-4.8)  k/uL


 


Monocytes #    (0-1.0)  k/uL


 


Eosinophils #    (0-0.7)  k/uL


 


Basophils #    (0-0.2)  k/uL


 


Sodium    (137-145)  mmol/L


 


Potassium    (3.5-5.1)  mmol/L


 


Chloride    ()  mmol/L


 


Carbon Dioxide    (22-30)  mmol/L


 


Anion Gap    mmol/L


 


BUN    (7-17)  mg/dL


 


Creatinine    (0.52-1.04)  mg/dL


 


Est GFR (CKD-EPI)AfAm    (>60 ml/min/1.73 sqM)  


 


Est GFR (CKD-EPI)NonAf    (>60 ml/min/1.73 sqM)  


 


Glucose    (74-99)  mg/dL


 


Plasma Lactic Acid Renzo    (0.7-2.0)  mmol/L


 


Calcium    (8.4-10.2)  mg/dL


 


Total Bilirubin    (0.2-1.3)  mg/dL


 


AST    (14-36)  U/L


 


ALT    (4-34)  U/L


 


Alkaline Phosphatase    ()  U/L


 


Troponin I  0.050 H*   (0.000-0.034)  ng/mL


 


NT-Pro-B Natriuret Pep   801  pg/mL


 


Total Protein    (6.3-8.2)  g/dL


 


Albumin    (3.5-5.0)  g/dL














- EKG Data


EKG shows normal: sinus rhythm, intervals (ND interval 169 ms,  ms, both 

normal.  You are is duration 162 ms, prolonged consistent with the right bundle-

branch block.), QRS complexes (Right bundle-branch block.  Left anterior 

fascicular block.)


Rate: normal (Rate 88 bpm)





Disposition


Clinical Impression: 


 COPD exacerbation, Elevated troponin I level, Acute respiratory failure





Disposition: ADMITTED AS IP TO THIS \[facility] [place] [facility]\""


Condition: Serious


Is patient prescribed a controlled substance at d/c from ED?: No

## 2022-02-17 LAB
ANION GAP SERPL CALC-SCNC: 6 MMOL/L
BASOPHILS # BLD AUTO: 0 K/UL (ref 0–0.2)
BASOPHILS NFR BLD AUTO: 0 %
BUN SERPL-SCNC: 14 MG/DL (ref 7–17)
CALCIUM SPEC-MCNC: 9.1 MG/DL (ref 8.4–10.2)
CHLORIDE SERPL-SCNC: 103 MMOL/L (ref 98–107)
CHOLEST SERPL-MCNC: 201 MG/DL (ref 0–200)
CO2 SERPL-SCNC: 26 MMOL/L (ref 22–30)
EOSINOPHIL # BLD AUTO: 0.1 K/UL (ref 0–0.7)
EOSINOPHIL NFR BLD AUTO: 1 %
ERYTHROCYTE [DISTWIDTH] IN BLOOD BY AUTOMATED COUNT: 4.56 M/UL (ref 3.8–5.4)
ERYTHROCYTE [DISTWIDTH] IN BLOOD: 13.7 % (ref 11.5–15.5)
GLUCOSE BLD-MCNC: 143 MG/DL (ref 75–99)
GLUCOSE BLD-MCNC: 145 MG/DL (ref 75–99)
GLUCOSE BLD-MCNC: 160 MG/DL (ref 75–99)
GLUCOSE BLD-MCNC: 164 MG/DL (ref 75–99)
GLUCOSE SERPL-MCNC: 156 MG/DL (ref 74–99)
HCT VFR BLD AUTO: 42.7 % (ref 34–46)
HDLC SERPL-MCNC: 66.1 MG/DL (ref 40–60)
HGB BLD-MCNC: 14.2 GM/DL (ref 11.4–16)
LDLC SERPL CALC-MCNC: 110.5 MG/DL (ref 0–131)
LYMPHOCYTES # SPEC AUTO: 0.6 K/UL (ref 1–4.8)
LYMPHOCYTES NFR SPEC AUTO: 5 %
MAGNESIUM SPEC-SCNC: 2.1 MG/DL (ref 1.6–2.3)
MCH RBC QN AUTO: 31.1 PG (ref 25–35)
MCHC RBC AUTO-ENTMCNC: 33.1 G/DL (ref 31–37)
MCV RBC AUTO: 93.8 FL (ref 80–100)
MONOCYTES # BLD AUTO: 0.4 K/UL (ref 0–1)
MONOCYTES NFR BLD AUTO: 3 %
NEUTROPHILS # BLD AUTO: 10.5 K/UL (ref 1.3–7.7)
NEUTROPHILS NFR BLD AUTO: 90 %
PLATELET # BLD AUTO: 261 K/UL (ref 150–450)
POTASSIUM SERPL-SCNC: 3.8 MMOL/L (ref 3.5–5.1)
SODIUM SERPL-SCNC: 135 MMOL/L (ref 137–145)
TRIGL SERPL-MCNC: 122 MG/DL (ref 0–149)
VLDLC SERPL CALC-MCNC: 24.4 MG/DL (ref 5–40)
WBC # BLD AUTO: 11.6 K/UL (ref 3.8–10.6)

## 2022-02-17 RX ADMIN — METHYLPREDNISOLONE SODIUM SUCCINATE SCH MG: 125 INJECTION, POWDER, FOR SOLUTION INTRAMUSCULAR; INTRAVENOUS at 05:58

## 2022-02-17 RX ADMIN — HALOPERIDOL LACTATE PRN MG: 5 INJECTION, SOLUTION INTRAMUSCULAR at 20:15

## 2022-02-17 RX ADMIN — HEPARIN SODIUM SCH: 5000 INJECTION INTRAVENOUS; SUBCUTANEOUS at 21:55

## 2022-02-17 RX ADMIN — METOPROLOL TARTRATE SCH MG: 50 TABLET, FILM COATED ORAL at 20:15

## 2022-02-17 RX ADMIN — ATORVASTATIN CALCIUM SCH MG: 20 TABLET, FILM COATED ORAL at 20:15

## 2022-02-17 RX ADMIN — LISINOPRIL AND HYDROCHLOROTHIAZIDE SCH EACH: 25; 20 TABLET ORAL at 12:36

## 2022-02-17 RX ADMIN — METHYLPREDNISOLONE SODIUM SUCCINATE SCH MG: 125 INJECTION, POWDER, FOR SOLUTION INTRAMUSCULAR; INTRAVENOUS at 12:37

## 2022-02-17 RX ADMIN — ISODIUM CHLORIDE SCH MG: 0.03 SOLUTION RESPIRATORY (INHALATION) at 11:43

## 2022-02-17 RX ADMIN — INSULIN ASPART SCH UNIT: 100 INJECTION, SOLUTION INTRAVENOUS; SUBCUTANEOUS at 12:37

## 2022-02-17 RX ADMIN — ASPIRIN 81 MG CHEWABLE TABLET SCH MG: 81 TABLET CHEWABLE at 08:28

## 2022-02-17 RX ADMIN — BUDESONIDE SCH MG: 0.5 INHALANT ORAL at 08:02

## 2022-02-17 RX ADMIN — INSULIN ASPART SCH UNIT: 100 INJECTION, SOLUTION INTRAVENOUS; SUBCUTANEOUS at 06:36

## 2022-02-17 RX ADMIN — BUDESONIDE SCH: 0.5 INHALANT ORAL at 20:17

## 2022-02-17 RX ADMIN — METHYLPREDNISOLONE SODIUM SUCCINATE SCH MG: 125 INJECTION, POWDER, FOR SOLUTION INTRAMUSCULAR; INTRAVENOUS at 17:17

## 2022-02-17 RX ADMIN — INSULIN ASPART SCH: 100 INJECTION, SOLUTION INTRAVENOUS; SUBCUTANEOUS at 17:18

## 2022-02-17 RX ADMIN — HEPARIN SODIUM SCH UNIT: 5000 INJECTION INTRAVENOUS; SUBCUTANEOUS at 08:27

## 2022-02-17 RX ADMIN — AZITHROMYCIN SCH MG: 500 TABLET, FILM COATED ORAL at 08:28

## 2022-02-17 RX ADMIN — IPRATROPIUM BROMIDE SCH MG: 0.5 SOLUTION RESPIRATORY (INHALATION) at 08:02

## 2022-02-17 RX ADMIN — IPRATROPIUM BROMIDE SCH MG: 0.5 SOLUTION RESPIRATORY (INHALATION) at 11:43

## 2022-02-17 RX ADMIN — FAMOTIDINE SCH MG: 20 TABLET, FILM COATED ORAL at 20:15

## 2022-02-17 RX ADMIN — IPRATROPIUM BROMIDE AND ALBUTEROL SULFATE SCH: .5; 3 SOLUTION RESPIRATORY (INHALATION) at 20:17

## 2022-02-17 RX ADMIN — IPRATROPIUM BROMIDE AND ALBUTEROL SULFATE SCH ML: .5; 3 SOLUTION RESPIRATORY (INHALATION) at 15:43

## 2022-02-17 RX ADMIN — ISODIUM CHLORIDE SCH MG: 0.03 SOLUTION RESPIRATORY (INHALATION) at 08:02

## 2022-02-17 RX ADMIN — INSULIN ASPART SCH: 100 INJECTION, SOLUTION INTRAVENOUS; SUBCUTANEOUS at 21:56

## 2022-02-17 RX ADMIN — METHYLPREDNISOLONE SODIUM SUCCINATE SCH MG: 125 INJECTION, POWDER, FOR SOLUTION INTRAMUSCULAR; INTRAVENOUS at 23:56

## 2022-02-17 RX ADMIN — CEFAZOLIN SCH: 330 INJECTION, POWDER, FOR SOLUTION INTRAMUSCULAR; INTRAVENOUS at 21:56

## 2022-02-17 RX ADMIN — CEFAZOLIN SCH: 330 INJECTION, POWDER, FOR SOLUTION INTRAMUSCULAR; INTRAVENOUS at 04:16

## 2022-02-17 RX ADMIN — METOPROLOL TARTRATE SCH MG: 50 TABLET, FILM COATED ORAL at 12:38

## 2022-02-17 NOTE — P.CRDCN
History of Present Illness


History of present illness: 


HISTORY OF PRESENTING ILLNESS


This is a pleasant 71-year-old female past medical history significant for 

hypertension, dyslipidemia, GERD, anxiety/depression, Obstructive sleep apnea 

wears CPAP and COPD.  She does not follow with a cardiologist. [facility] We have been asked to see in consultation for elevated 

troponin. Patient presents with complaints of shortness of breath, productive 

cough, dyspnea and hypoxia. She states she has chronic shortness of breath, 

[facility] JEAN and COPD. Since Monday her shortness of

breath has become progressively worse and severe with activity and at rest and 

she called EMS. Her oxygen saturations were 70% on room air and placed supine 

with CPAP and possible saturation up to 94% and received 1 dose of steroids and 

was brought to [facility]. She denies any history of MI, Stroke, 

Diabetes, CAD, heart failure. She states she has chronic lower extremity edema, 

has not worsened and has been treated with PO Lasix. She denies any tobacco or 

alcohol use.





DIAGNOSTICS


EKG reveals sinus rhythm, HR 88, right bundle branch block, left anterior 

fasicular block, T wave inversion in leads aVL, V2. No acute ishemia. QTc 470. 

No prior EKG to compare


Echocardiogram revealed an EF 55-60%, trace mitral regurgitation, trace 

tricuspid regurgitation


Telemetry tracings indicate sinus rhythm in the 80s-90s.


CT chest negative for pulmonary embolism, lungs are clear and free of 

infiltrate, no heart failure.


Laboratory reviewed, WBC 15.8, hemoglobin 14, platelets 245, sodium 129, 

potassium 3.5, BUN 14, segment 0.8, troponin 0.05 and second negative, proBNP 

801, covid negative 


Current home cardiac medications include potassium chloride, simvastatin 40 mg 

nightly, metoprolol tartrate 50 mg twice a day, lisinoprilsurgical or thiazide 

2025 milligrams daily, Lasix 20 mg daily





REVIEW OF SYSTEMS


At the time of my exam:


CONSTITUTIONAL: Denies fever or chills.


CARDIOVASCULAR: Denies chest pain, +shortness of breath, Denies orthopnea, PND 

or palpitations.


RESPIRATORY: +cough with yellow/brown sputum  


GASTROINTESTINAL: Denies abdominal pain, diarrhea, constipation, nausea or 

vomiting.


MUSCULOSKELETAL: Denies myalgias.


NEUROLOGIC: Denies numbness, tingling, headacbe or weakness.


ENDOCRINE: Denies fatigue, weight change,  polydipsia or polyurina.


GENITOURINARY: Denies burning, hematuria or urgency with micturation.


HEMATOLOGIC: Denies history of anemia or bleeding. 





PHYSICAL EXAMINATION


Vitals reviewed


CONSTITUTIONAL: No apparent distress. 


HEENT: Head is normocephalic. Pupils are equal, round. Sclerae anicteric. Mucous

membranes of the mouth are moist.  No JVD. No carotid bruit.


CHEST EXAMINATION: Lungs are clear to auscultation. No chest wall tenderness is 

noted on palpation or with deep breathing. 


HEART EXAMINATION: Regular rate and rhythm. S1, S2 heard. No murmurs, gallops or

rub.


ABDOMEN: Soft, nontender. Positive bowel sounds.


EXTREMITIES: 2+ peripheral pulses, moderate non-pitting lower extremity edema 

and no calf tenderness.


NEUROLOGIC EXAMINATION: Patient is awake, alert and oriented x3. 





ASSESSMENT


Shortness of breath, cough, unclear etiology


Elevated troponin, likely due to hypoxia and supply and demand mismatch, repeat 

negative 


Leukocytosis 


Hyponatremia


Hypertension


Dyslipidemia


History of GERD


History of anxiety/depression


Obstructive sleep apnea wears CPA


History of COPD/Asthma





PLAN


Repeat troponin negative. Echocardiogram revealed EF 55-60%, trace mitral 

regurgitation, trace tricuspid regurgitation. Patient's symptoms due not appear 

to be cardiac related. We will follow the patient as needed. Please reconsult if

needed.  





Nurse practitioner note has been reviewed by physician. Signing provider agrees 

with the documented findings, assessment, and plan of care. 














Past Medical History


Past Medical History: COPD, Eye Disorder, GERD/Reflux, Hypertension, Pneumonia, 

Sleep Apnea/CPAP/BIPAP, Thyroid Disorder


Additional Past Medical History / Comment(s): Urinary incontinence/pt has 

bladder stimulator in place, UTIs, JEAN with Cpap device, bilateral glaucoma, 

bronchitis, bilateral leg/pedal edema,


History of Any Multi-Drug Resistant Organisms: None Reported


Past Surgical History: Cholecystectomy, Hysterectomy, Orthopedic Surgery


Additional Past Surgical History / Comment(s): Bladder stimulator, bilateral 

feet bunionectomies, colonoscopy


Past Anesthesia/Blood Transfusion Reactions: No Reported Reaction


Smoking Status: Never smoker





- Past Family History


  ** Father


Additional Family Medical History / Comment(s): Respiratory problems





  ** Mother


Additional Family Medical History / Comment(s): Heart problems.





Medications and Allergies


                                Home Medications











 Medication  Instructions  Recorded  Confirmed  Type


 


Acetaminophen Tab [Tylenol Tab] 500 mg PO Q4H PRN MDD 6 TABS 02/16/22 02/16/22 

History


 


Albuterol Sulfate [Albuterol 2 puff PO RT-Q8H PRN 02/16/22 02/16/22 History





Sulfate Hfa]    


 


Alendronate Sodium 70 mg PO Q7D 02/16/22 02/16/22 History


 


Calcium Carbonate/Vitamin D3 1 tab PO BID 02/16/22 02/16/22 History





[Calcium 500 mg-Vit D3 5 mcg (200    





Unit)]    


 


Carboxymethylcell/Glycerin/Pf 1 drop BOTH EYES QID 02/16/22 02/16/22 History





[Refresh Relieva Pf 0.5-1% Drop]    


 


DULoxetine HCL [Cymbalta] 60 mg PO DAILY 02/16/22 02/16/22 History


 


Ergocalciferol [Vitamin D2 (1250 1,250 mcg PO Q14D 02/16/22 02/16/22 History





Mcg = 39845 Iu)]    


 


Felodipine [Felodipine ER] 10 mg PO BID 02/16/22 02/16/22 History


 


Fluticasone/Salmeterol [Advair 1 inhalation PO RT-BID 02/16/22 02/16/22 History





100-50 Diskus]    


 


Furosemide [Lasix] 10 mg PO DAILY 02/16/22 02/16/22 History


 


Ipratropium/Albuter 20-100Mcg 1 puff INHALATION RT-QID PRN 02/16/22 02/16/22 

History





[Combivent Respimat 20-100Mcg    





Inhaler]    


 


Latanoprost [Xalatan 0.005%] 1 drop BOTH EYES HS 02/16/22 02/16/22 History


 


Lisinopril-Hctz 20-25 mg 1 tab PO DAILY 02/16/22 02/16/22 History





[Zestoretic 20-25]    


 


Metoprolol Tartrate [Lopressor] 50 mg PO BID 02/16/22 02/16/22 History


 


Omeprazole [PriLOSEC] 40 mg PO DAILY 02/16/22 02/16/22 History


 


Oxybutynin Chloride [Ditropan] 5 mg PO TID 02/16/22 02/16/22 History


 


Potassium Chloride [Klor-Con 10 ER] 10 meq PO Q48H 02/16/22 02/16/22 History


 


QUEtiapine FUMARATE [SEROquel] 300 mg PO HS 02/16/22 02/16/22 History


 


Simvastatin [Zocor] 40 mg PO HS 02/16/22 02/16/22 History


 


lamoTRIgine [LaMICtal] 100 mg PO BID 02/16/22 02/16/22 History


 


lamoTRIgine [LaMICtal] 200 mg PO BID 02/16/22 02/16/22 History








                                    Allergies











Allergy/AdvReac Type Severity Reaction Status Date / Time


 


paliperidone [From Invega] Allergy  swelling Verified 02/16/22 08:50





   of tongue  


 


Penicillins Allergy  Rash/Hives Verified 02/16/22 08:50














Physical Exam


Vitals: 


                                   Vital Signs











  Temp Pulse Resp BP Pulse Ox


 


 02/16/22 14:00   85  16  136/80  99


 


 02/16/22 11:48   83   


 


 02/16/22 11:34   79   


 


 02/16/22 09:14   82  20  126/80  98


 


 02/16/22 07:48  97.8 F  96  20  129/67  99


 


 02/16/22 07:26   85   


 


 02/16/22 07:16   83   


 


 02/16/22 06:34  97.7 F  85  28 H  155/90  98








                                Intake and Output











 02/15/22 02/16/22 02/16/22





 22:59 06:59 14:59


 


Other:   


 


  Weight  103.873 kg 103.873 kg














Results





                                 02/17/22 07:17





                                 02/17/22 07:17


                                 Cardiac Enzymes











  02/16/22 02/16/22 Range/Units





  06:25 06:25 


 


AST  24   (14-36)  U/L


 


Troponin I   0.050 H*  (0.000-0.034)  ng/mL








                                       CBC











  02/16/22 Range/Units





  06:25 


 


WBC  15.8 H  (3.8-10.6)  k/uL


 


RBC  4.52  (3.80-5.40)  m/uL


 


Hgb  14.0  (11.4-16.0)  gm/dL


 


Hct  42.2  (34.0-46.0)  %


 


Plt Count  245  (150-450)  k/uL








                          Comprehensive Metabolic Panel











  02/16/22 Range/Units





  06:25 


 


Sodium  129 L  (137-145)  mmol/L


 


Potassium  3.5  (3.5-5.1)  mmol/L


 


Chloride  95 L  ()  mmol/L


 


Carbon Dioxide  24  (22-30)  mmol/L


 


BUN  14  (7-17)  mg/dL


 


Creatinine  0.80  (0.52-1.04)  mg/dL


 


Glucose  175 H  (74-99)  mg/dL


 


Calcium  8.8  (8.4-10.2)  mg/dL


 


AST  24  (14-36)  U/L


 


ALT  15  (4-34)  U/L


 


Alkaline Phosphatase  103  ()  U/L


 


Total Protein  6.9  (6.3-8.2)  g/dL


 


Albumin  3.8  (3.5-5.0)  g/dL








                               Current Medications











Generic Name Dose Route Start Last Admin





  Trade Name Freq  PRN Reason Stop Dose Admin


 


Albuterol Sulfate  2.5 mg  02/16/22 08:00  02/16/22 11:33





  Albuterol Nebulized 2.5 Mg/3 Ml  INHALATION   2.5 mg





  RT-QID ABILIO   Administration


 


Aspirin  81 mg  02/16/22 09:00  02/16/22 08:42





  Aspirin 81 Mg  PO   81 mg





  DAILY ABILIO   Administration


 


Azithromycin  500 mg  02/16/22 09:00  02/16/22 08:42





  Azithromycin 500 Mg Tab  PO   500 mg





  DAILY ABILIO   Administration


 


Budesonide  0.5 mg  02/16/22 08:00  02/16/22 11:32





  Budesonide 0.5 Mg/2 Ml Nebu  INHALATION   0.5 mg





  RT-BID ABILIO   Administration


 


Famotidine  20 mg  02/17/22 09:00 





  Famotidine 20 Mg/2 Ml Vial  IV  





  DAILY ABILIO  


 


Heparin Sodium (Porcine)  5,000 unit  02/16/22 09:00  02/16/22 08:44





  Heparin Sodium,Porcine/Pf 5,000 Unit/0.5 Ml Syringe  SQ   5,000 unit





  Q12HR ABILIO   Administration


 


Sodium Chloride  1,000 mls @ 100 mls/hr  02/16/22 07:45 





  Saline 0.9%  IV  





  .Q10H ABILIO  


 


Ipratropium Bromide  0.5 mg  02/16/22 08:00  02/16/22 11:32





  Ipratropium 0.5 Mg/2.5 Ml Nebu  INHALATION   0.5 mg





  RT-QID ABILIO   Administration


 


Methylprednisolone Sodium Succinate  60 mg  02/16/22 12:00  02/16/22 08:43





  Methylprednisolone Sod Succi 125 Mg/2 Ml Vial  IV   60 mg





  Q6HR ABILIO   Administration








                                Intake and Output











 02/15/22 02/16/22 02/16/22





 22:59 06:59 14:59


 


Other:   


 


  Weight  103.873 kg 103.873 kg








                                 Patient Weight











 02/17/22





 06:59


 


Weight 103.873 kg








                                        





                                 02/16/22 06:25 





                                 02/16/22 06:25
Detail Level: Zone
Initiate Treatment: Adapalene 0.3% gel to full face at bedtime
Modify Regimen: Hold Tretinoin 0.05% cream until weather is more humid due to skin being overly dry and sensitive

## 2022-02-17 NOTE — P.PN
Subjective





This is a pleasant 71 years old female with past medical history of hypertension

and hypothyroidism


Presents because of dyspnea and hypoxia, and EMS was saturation of 70% on room 

air and placed supine with CPAP and possible saturation up to 94% and received 1

dose of steroids.


Patient came from home, she has a legal guardian.  Patient states that her 

problems started with a cold over the weekend and to get worse but later on she 

said that her shortness of breath actually started last Monday and it was 

gradually worse and that she is on home dose of oxygen 5 L/m as she thinks.  

Also she is complaining of from cough and some creamy phlegm but no chest pain 

or abdominal pain.  No diarrhea or vomiting.  No urinary complaints, she has 

some mild headache which is improving, no weakness or numbness or double vision 

or slurred speech.





She was hypoxic on the way to the hospital at 70% on room air, currently she is 

on BiPAP was saturating 98%, mildly tachypneic at 20, afebrile.


Troponin elevated 0.05.


 chest x-ray: Chronic parenchymal changes and cardiomegaly without acute 

pulmonary process. proBNP is 801


EKG showing right bundle branch block and left anterior fascicular block with 

.


In the emergency room she was started on Zithromax, IV Solu-Medrol and normal 

saline at 100 mL per hour


Pulmonary team were consulted








02/17/2022


Patient mildly tachypneic while sitting in bed, no maternal wheezing on 

examination.  Only mild 1.


Patient says she coughs a lot with little phlegm but no chest pain.


She's been using BiPAP most of the night in the morning she was placed on 6 L 

per minute of oxygen with saturation in high 90s.


CT of the chest is negative


Pulmonary on the case


Cardiology consulted for high troponin.


Patient states that albuterol inhaler causing her shakiness and exactly and 

wanted to be switched, I checked with the pharmacy we don't carry levoalbuterol,

therefore was initiated to ipratropium as needed.





Objective





- Vital Signs


Vital signs: 


                                   Vital Signs











Temp  97.6 F   02/17/22 04:00


 


Pulse  92   02/17/22 08:14


 


Resp  20   02/17/22 08:00


 


BP  174/81   02/17/22 08:00


 


Pulse Ox  96   02/17/22 08:00








                                 Intake & Output











 02/16/22 02/17/22 02/17/22





 18:59 06:59 18:59


 


Intake Total 240 320 240


 


Output Total 650 1225 


 


Balance -410 -905 240


 


Weight 103.873 kg  


 


Intake:   


 


  Oral 240 320 240


 


Output:   


 


  Urine 650 1225 


 


Other:   


 


  Voiding Method Bedside Commode  


 


  # Bowel Movements 1  














- Exam





-GENERAL: The patient is alert and oriented x3, not in any acute distress.  Morb

idly obese


HEENT: Pupils are round and equally reacting to light. EOMI. No scleral icterus.

No conjunctival pallor. Normocephalic, atraumatic. No pharyngeal erythema. No 

thyromegaly. 


CARDIOVASCULAR: S1 and S2 present. No murmurs, rubs, or gallops. 


-PULMONARY: Chest is clear to auscultation,   Mild scattered wheezing. no 

crackles


ABDOMEN: Soft, nontender, nondistended, normoactive bowel sounds. No palpable 

organomegaly. 


MUSCULOSKELETAL: No joint swelling or deformity. 


EXTREMITIES: No cyanosis, clubbing, or pedal edema. 


NEUROLOGICAL: Gross neurological examination did not reveal any focal deficits. 


SKIN: No rashes. no petechiae.





- Labs


CBC & Chem 7: 


                                 02/17/22 07:17





                                 02/17/22 07:17


Labs: 


                  Abnormal Lab Results - Last 24 Hours (Table)











  02/16/22 02/16/22 02/17/22 Range/Units





  16:33 19:48 06:19 


 


WBC     (3.8-10.6)  k/uL


 


Neutrophils #     (1.3-7.7)  k/uL


 


Lymphocytes #     (1.0-4.8)  k/uL


 


Sodium     (137-145)  mmol/L


 


Glucose     (74-99)  mg/dL


 


POC Glucose (mg/dL)  214 H  172 H  164 H  (75-99)  mg/dL














  02/17/22 02/17/22 Range/Units





  07:17 07:17 


 


WBC  11.6 H   (3.8-10.6)  k/uL


 


Neutrophils #  10.5 H   (1.3-7.7)  k/uL


 


Lymphocytes #  0.6 L   (1.0-4.8)  k/uL


 


Sodium   135 L  (137-145)  mmol/L


 


Glucose   156 H  (74-99)  mg/dL


 


POC Glucose (mg/dL)    (75-99)  mg/dL

## 2022-02-17 NOTE — CDI
Demand ischemia





Documentation Clarification Form



Date: 02/17/2022 01:32:49 PM

From: Radha Greco RN, CCDS

Phone: [phone]

MRN: [id number]

Admit Date: 02/16/2022 07:36:00 AM

Patient Name: Kim Melendez

Visit Number: [id number]

Discharge Date:  





ATTENTION: The Clinical Documentation Specialists (CDI) and [facility] Coding Staff 
appreciate your assistance in clarifying documentation. Please respond to the 
clarification below the line at the bottom and electronically sign. The CDI & 
[facility] Coding staff will review the response and follow-up if needed. Please note: 
Queries are made part of the Legal Health Record. If you have any questions, 
please contact the author of this message via ITS.



Dr. Ruben Mayfield





There is documentation of elevated troponin, likely due to hypoxia and supply 
and demand mismatch.  Additional clarification is requested.



History/Risk Factors: Hypertension, COPD, Asthma Dyslipidemia, Sleep 
Apnea/CPAP/BIPAP 



Clinical Indicators: 71-year-old female present with complaints of shortness of 
breath, productive cough, dyspnea and hypoxia, She had elevated troponin 0.05, 
0.024

2/16 Vital signs: 155/90 85 28 97.7 98 % BIPAP 

proBNP 801.

 EKG reveals sinus rhythm, HR 88, right bundle branch block, left anterior 
fascicular block, T wave inversion in leads aVL, V2. No acute ischemia. QTc 470.
No prior EKG to compare.

ECHO: EF 55-60 %, trace mitral regurgitation, trace tricuspid regurgitation. 

CT chest negative for pulmonary embolism, lungs are clear and free of 
infiltrate, no heart failure



2/17 Cardiology consult: Elevated troponin, likely due to hypoxia and supply and
demand mismatch, repeat negative



Treatment:

Telemetry monitoring

Monitor O2 Sat's (titrate)

Metoprolol tartrate 50MG PO BID, Lisinopril/Hctz 20-25 PO Daily

Albuterol Duonebs 0.5Mg-3 Mg/3ml soln QID



Can you please further clarify supply and demand mismatch]?



[  ] Demand ischemia without MI (other forms of acute ischemic heart disease)

[  ] Type II MI due to hypoxia 

[  ] Other, please specify ________

[  ] Unable to determine





(Template Last Revised: March 2021)

___________________________________________________________________________

MTDD

## 2022-02-17 NOTE — P.PN
Subjective


Progress Note Date: 02/17/22


Principal diagnosis: 





Respiratory failure.





Pulmonary consult dated 02/16/2022.





71-year-old female brought in from the adult foster care home, for shortness of 

breath.  She was seen in the emergency room this morning.  The patient complains

of shortness of breath, which has been going on for at least a day or so prior 

to admission, may be a bit longer.  She is not a good historian, and she is 

currently on BiPAP which makes communication and history taking a bit difficult.

 She denies any fever or chills.  She does have a bit of a cough but she is not 

producing any phlegm.  There is no chest pain or chest discomfort.  No nausea, 

vomiting, or diarrhea.  No leg swelling or edema.  Currently, the patient's on 

BiPAP at 12/6 and 100%.  The patient's getting saline at KVO.  Her coronavirus 

testing was negative.  Her most recent vital signs include a blood pressure 

135/72, heart rate 84 respiratory rate 18-20 breaths per minute, and a 

saturation of 99%.  Her chest x-ray showed only chronic changes, with nothing 

acute.  White count 15.8, hemoglobin 14, hematocrit 42.2, platelet count 2 

45,000.  Sodium 129, potassium 3.5, chlorides 95, CO2 24, anion gap 10, BUN 14, 

creatinine 0.8.  Troponin is 0.050.  N-terminal proBNP is 801.  Albumin is 3.8. 

Chest x-ray looks mostly clear.





Progress note dated 02/17/2022.





71-year-old female seen yesterday in the emergency department.  She was brought 

into the hospital from adult foster care home.  Her complaint was shortness of 

breath.  When I saw her yesterday, she was on BiPAP at 12/6 and 100%.  Today, 

she feels much better.  She states that she did not use of BiPAP last night.  

The patient is currently on 5 L nasal cannula.  She's getting saline at 20 mL an

hour.  She is not on home O2.  She does have a history of sleep apnea syndrome 

but has not used her CPAP device because apparently it was recalled.  She denies

previous tobacco use.  She apparently sees a pulmonary doctor on the outside and

I asked whether or not he given her a diagnosis but she states that she can't 

remember what it is.  She denied COPD, asthma, emphysema, chronic bronchitis, 

etc.  On her ER nadine, says she has COPD, but she states that she never smokes 

cigarettes.  She apparently does have a history of gastroesophageal reflux 

disease, hypertension, sleep apnea syndrome, and hypothyroidism.  She has not 

been using her CPAP device.  I ordered a CT angiogram yesterday, he that she 

might have a pulmonary embolism, but her computed tomography scan was negative 

for PE, and also, the lung fields were clear of any infiltrates.  Laboratory da

ta today includes a white count of 11.6, normal hemoglobin hematocrit and 

platelet count.  Electrolytes for the most part are normal.  Sodium is just a 

bit low 135.  The rest of the labs look excellent.





Objective





- Vital Signs


Vital signs: 


                                   Vital Signs











Temp  97.6 F   02/17/22 04:00


 


Pulse  90   02/17/22 11:55


 


Resp  20   02/17/22 08:00


 


BP  174/81   02/17/22 08:00


 


Pulse Ox  96   02/17/22 08:00








                                 Intake & Output











 02/16/22 02/17/22 02/17/22





 18:59 06:59 18:59


 


Intake Total 240 320 240


 


Output Total 650 1225 


 


Balance -410 -905 240


 


Weight 103.873 kg  


 


Intake:   


 


  Oral 240 320 240


 


Output:   


 


  Urine 650 1225 


 


Other:   


 


  Voiding Method Bedside Commode  Bedside Commode


 


  # Bowel Movements 1  














- Exam





Oriented 3, much improved today, currently on 5 L nasal cannula.  No 

conversational dyspnea or use of accessory muscles.  No audible wheezing.





HEENT examination is grossly unremarkable. 





Neck supple.  Full range of motion.  No adenopathy thyromegaly or neck vein 

distention.





Cardiovascular examination reveals regular rhythm rate.  S1-S2 normal.  No S3 or

S4.  No discernible murmur noted.  Heart rate 90 bpm.





Lungs reveal improved bilateral breath sounds.  Scattered rhonchi that occurred 

yesterday had mostly dissipated.  No wheezes.  No crackles.  Saturations are 96%

on 5 L.





Abdomen is soft.  Bowel sounds are noted.  No masses or tenderness.





Extremities are intact.  No cyanosis clubbing or edema.





Skin is without rash or lesion.





Neurologic examination is brief but nonfocal.





- Labs


CBC & Chem 7: 


                                 02/17/22 07:17





                                 02/17/22 07:17


Labs: 


                  Abnormal Lab Results - Last 24 Hours (Table)











  02/16/22 02/16/22 02/17/22 Range/Units





  16:33 19:48 06:19 


 


WBC     (3.8-10.6)  k/uL


 


Neutrophils #     (1.3-7.7)  k/uL


 


Lymphocytes #     (1.0-4.8)  k/uL


 


Sodium     (137-145)  mmol/L


 


Glucose     (74-99)  mg/dL


 


POC Glucose (mg/dL)  214 H  172 H  164 H  (75-99)  mg/dL


 


Procalcitonin     (0.02-0.09)  ng/mL














  02/17/22 02/17/22 02/17/22 Range/Units





  07:17 07:17 07:17 


 


WBC   11.6 H   (3.8-10.6)  k/uL


 


Neutrophils #   10.5 H   (1.3-7.7)  k/uL


 


Lymphocytes #   0.6 L   (1.0-4.8)  k/uL


 


Sodium    135 L  (137-145)  mmol/L


 


Glucose    156 H  (74-99)  mg/dL


 


POC Glucose (mg/dL)     (75-99)  mg/dL


 


Procalcitonin  0.39 H    (0.02-0.09)  ng/mL














Assessment and Plan


Assessment: 





Shortness of breath, of unclear etiology.  Pulmonary embolism has been ruled out

by CT angiogram.  No evidence of pneumonia.  The patient is a lifelong 

nonsmoker.  Doubt significant COPD.





Questionable history of COPD.





History of obstructive sleep apnea syndrome, currently not using CPAP.





History of hypertension.





History of hyperlipidemia.





History of gastroesophageal reflux disease.





History of anxiety/depression.





History of urinary incontinence.





History of osteoporosis.


Plan: 





Plan dated 02/16/2022.





The patient should be sent for a CT angiogram to rule out pulmonary embolism.  

There is a vague history of COPD although the patient apparently is a lifelong 

non-tobacco user.  She may have underlying asthma.  That does not appear to be 

particularly active at this time.  Additional recommendations and suggestions 

are forthcoming.  Prognosis is guarded.  We will continue to follow.  FiO2 can 

be weaned on the BiPAP device.





Plan dated 02/17/2022.





The patient appears to be much improved today.  I'm not sure exactly what is 

going on with this patient as she appears not have any underlying COPD.  The CT 

angiogram was negative for pulmonary embolism.  The lung fields themselves were 

clear.  The patient states that she never smoked cigarettes.  She does have a 

history of obstructive sleep apnea syndrome, but has been noncompliant with her 

CPAP device recently because his been recalled.  She denies any prior history of

any lung disease including COPD, emphysema, chronic bronchitis, asthma, etc.  

She apparently has seen a pulmonologist in the past, but she could not remember 

the doctor's name or any specific diagnosis that she was given.  I would like to

see her in the office after discharge, for complete pulmonary function testing.


Time with Patient: Less than 30

## 2022-02-18 LAB
GLUCOSE BLD-MCNC: 130 MG/DL (ref 75–99)
GLUCOSE BLD-MCNC: 132 MG/DL (ref 75–99)
GLUCOSE BLD-MCNC: 134 MG/DL (ref 75–99)

## 2022-02-18 RX ADMIN — CEFAZOLIN SCH: 330 INJECTION, POWDER, FOR SOLUTION INTRAMUSCULAR; INTRAVENOUS at 05:56

## 2022-02-18 RX ADMIN — FAMOTIDINE SCH MG: 20 TABLET, FILM COATED ORAL at 09:04

## 2022-02-18 RX ADMIN — ASPIRIN 81 MG CHEWABLE TABLET SCH MG: 81 TABLET CHEWABLE at 09:04

## 2022-02-18 RX ADMIN — BUDESONIDE SCH MG: 0.5 INHALANT ORAL at 08:36

## 2022-02-18 RX ADMIN — IPRATROPIUM BROMIDE AND ALBUTEROL SULFATE SCH ML: .5; 3 SOLUTION RESPIRATORY (INHALATION) at 16:28

## 2022-02-18 RX ADMIN — INSULIN ASPART SCH: 100 INJECTION, SOLUTION INTRAVENOUS; SUBCUTANEOUS at 08:39

## 2022-02-18 RX ADMIN — AZITHROMYCIN SCH MG: 500 TABLET, FILM COATED ORAL at 09:04

## 2022-02-18 RX ADMIN — INSULIN ASPART SCH UNIT: 100 INJECTION, SOLUTION INTRAVENOUS; SUBCUTANEOUS at 17:22

## 2022-02-18 RX ADMIN — IPRATROPIUM BROMIDE AND ALBUTEROL SULFATE SCH ML: .5; 3 SOLUTION RESPIRATORY (INHALATION) at 11:58

## 2022-02-18 RX ADMIN — CEFAZOLIN SCH: 330 INJECTION, POWDER, FOR SOLUTION INTRAMUSCULAR; INTRAVENOUS at 12:19

## 2022-02-18 RX ADMIN — HEPARIN SODIUM SCH UNIT: 5000 INJECTION INTRAVENOUS; SUBCUTANEOUS at 09:04

## 2022-02-18 RX ADMIN — HEPARIN SODIUM SCH: 5000 INJECTION INTRAVENOUS; SUBCUTANEOUS at 23:11

## 2022-02-18 RX ADMIN — LISINOPRIL AND HYDROCHLOROTHIAZIDE SCH EACH: 25; 20 TABLET ORAL at 09:04

## 2022-02-18 RX ADMIN — INSULIN ASPART SCH UNIT: 100 INJECTION, SOLUTION INTRAVENOUS; SUBCUTANEOUS at 12:35

## 2022-02-18 RX ADMIN — IPRATROPIUM BROMIDE AND ALBUTEROL SULFATE SCH ML: .5; 3 SOLUTION RESPIRATORY (INHALATION) at 19:31

## 2022-02-18 RX ADMIN — METOPROLOL TARTRATE SCH MG: 50 TABLET, FILM COATED ORAL at 20:29

## 2022-02-18 RX ADMIN — METHYLPREDNISOLONE SODIUM SUCCINATE SCH MG: 125 INJECTION, POWDER, FOR SOLUTION INTRAMUSCULAR; INTRAVENOUS at 12:35

## 2022-02-18 RX ADMIN — METOPROLOL TARTRATE SCH MG: 50 TABLET, FILM COATED ORAL at 09:04

## 2022-02-18 RX ADMIN — INSULIN ASPART SCH: 100 INJECTION, SOLUTION INTRAVENOUS; SUBCUTANEOUS at 20:25

## 2022-02-18 RX ADMIN — FAMOTIDINE SCH MG: 20 TABLET, FILM COATED ORAL at 20:29

## 2022-02-18 RX ADMIN — ATORVASTATIN CALCIUM SCH MG: 20 TABLET, FILM COATED ORAL at 20:29

## 2022-02-18 RX ADMIN — METHYLPREDNISOLONE SODIUM SUCCINATE SCH MG: 125 INJECTION, POWDER, FOR SOLUTION INTRAMUSCULAR; INTRAVENOUS at 06:19

## 2022-02-18 RX ADMIN — IPRATROPIUM BROMIDE AND ALBUTEROL SULFATE SCH ML: .5; 3 SOLUTION RESPIRATORY (INHALATION) at 08:36

## 2022-02-18 NOTE — P.PN
Subjective





This is a pleasant 71 years old female with past medical history of hypertension

and hypothyroidism


Presents because of dyspnea and hypoxia, and EMS was saturation of 70% on room 

air and placed supine with CPAP and possible saturation up to 94% and received 1

dose of steroids.


Patient came from home, she has a legal guardian.  Patient states that her 

problems started with a cold over the weekend and to get worse but later on she 

said that her shortness of breath actually started last Monday and it was 

gradually worse and that she is on home dose of oxygen 5 L/m as she thinks.  

Also she is complaining of from cough and some creamy phlegm but no chest pain 

or abdominal pain.  No diarrhea or vomiting.  No urinary complaints, she has 

some mild headache which is improving, no weakness or numbness or double vision 

or slurred speech.





She was hypoxic on the way to the hospital at 70% on room air, currently she is 

on BiPAP was saturating 98%, mildly tachypneic at 20, afebrile.


Troponin elevated 0.05.


 chest x-ray: Chronic parenchymal changes and cardiomegaly without acute 

pulmonary process. proBNP is 801


EKG showing right bundle branch block and left anterior fascicular block with 

.


In the emergency room she was started on Zithromax, IV Solu-Medrol and normal 

saline at 100 mL per hour


Pulmonary team were consulted








02/17/2022


Patient mildly tachypneic while sitting in bed, no maternal wheezing on 

examination.  Only mild 1.


Patient says she coughs a lot with little phlegm but no chest pain.


She's been using BiPAP most of the night in the morning she was placed on 6 L 

per minute of oxygen with saturation in high 90s.


CT of the chest is negative


Pulmonary on the case


Cardiology consulted for high troponin.


Patient states that albuterol inhaler causing her shakiness and exactly and 

wanted to be switched, I checked with the pharmacy we don't carry levoalbuterol,

therefore was initiated to ipratropium as needed.





02/18/2022


A Neck and some shortness of breath, noncardiac causes per cardiologist was 

signed of the case and will see the patient on an as-needed basis.


Patient remains on 5 L oxygen, and she is remaining treated with some Medrol 60 

mg as well as Zithromax 500 mg and normal saline at 100 mL per hour with 

pulmonary team on the case.


procalcitonin is slightly elevated at 0.39.


Hemoglobin A1c is normal at 5.9%





Objective





- Vital Signs


Vital signs: 


                                   Vital Signs











Temp  97.7 F   02/18/22 09:00


 


Pulse  85   02/18/22 09:00


 


Resp  18   02/18/22 09:00


 


BP  167/86   02/18/22 09:00


 


Pulse Ox  94 L  02/18/22 09:00








                                 Intake & Output











 02/17/22 02/18/22 02/18/22





 18:59 06:59 18:59


 


Intake Total 520  240


 


Output Total  450 


 


Balance 520 -450 240


 


Intake:   


 


  Oral 520  240


 


Output:   


 


  Urine  450 


 


Other:   


 


  Voiding Method Bedside Commode Bedside Commode Bedside Commode


 


  # Voids 1  














- Exam





-GENERAL: The patient is alert and oriented x3, not in any acute distress.  

Morbidly obese


HEENT: Pupils are round and equally reacting to light. EOMI. No scleral icterus.

No conjunctival pallor. Normocephalic, atraumatic. No pharyngeal erythema. No 

thyromegaly. 


CARDIOVASCULAR: S1 and S2 present. No murmurs, rubs, or gallops. 


-PULMONARY: Chest is clear to auscultation,   Mild scattered wheezing. no 

crackles


ABDOMEN: Soft, nontender, nondistended, normoactive bowel sounds. No palpable 

organomegaly. 


MUSCULOSKELETAL: No joint swelling or deformity. 


EXTREMITIES: No cyanosis, clubbing, or pedal edema. 


NEUROLOGICAL: Gross neurological examination did not reveal any focal deficits. 


SKIN: No rashes. no petechiae.





- Labs


CBC & Chem 7: 


                                 02/17/22 07:17





                                 02/17/22 07:17


Labs: 


                  Abnormal Lab Results - Last 24 Hours (Table)











  02/17/22 02/17/22 02/17/22 Range/Units





  07:17 07:17 11:42 


 


POC Glucose (mg/dL)    160 H  (75-99)  mg/dL


 


Cholesterol   201.00 H   (0..00)  mg/dL


 


HDL Cholesterol   66.10 H   (40.00-60.00)  mg/dL


 


Procalcitonin  0.39 H    (0.02-0.09)  ng/mL














  02/17/22 02/17/22 02/18/22 Range/Units





  16:04 20:17 06:19 


 


POC Glucose (mg/dL)  143 H  145 H  130 H  (75-99)  mg/dL


 


Cholesterol     (0..00)  mg/dL


 


HDL Cholesterol     (40.00-60.00)  mg/dL


 


Procalcitonin     (0.02-0.09)  ng/mL














Assessment and Plan


Assessment: 








shortness of breath and hypoxia


Possible acute COPD exacerbation versus asthma


Elevated troponin, cardiac causes were ruled out and cardiologist signed off the

case


Acute on chronic hypoxic respiratory failure


Hyponatremia


Hypertension


History of hyperthyroidism


Obesity with BMI of 44.7








Plan: 





this is a pleasant 71 years old female who presents with dyspnea and elevated 

troponin


Continue with steroids and Zithromax.  


Continue with oxygen supply per requirement, and BiPAP as needed


Pulmonary consult


We'll add aspirin 81 mg, 


Labs and medication were reviewed..  Continue same treatment.  Continue with 

symptomatic treatment.  Resume home medication.  Monitor lytes and vitals.  DVT 

and GI prophylaxis.  Further recommendations depends on the clinical course of 

the patient


DVT prophylaxis: Subcutaneous heparin


GI Prophylaxis: Pepcid


PT/OT: Deferred


Prognosis is guarded

## 2022-02-18 NOTE — XR
EXAMINATION TYPE: XR KUB

 

DATE OF EXAM: 2/18/2022

 

COMPARISON: NONE

 

HISTORY: Pain

 

TECHNIQUE: Single supine KUB image of the abdomen is obtained

 

FINDINGS:  

Small bowel demonstrates no evidence for dilatation or air fluid levels.  

 

Gas and fecal material is seen in non-distended colon.  

 

No convincing evidence for pneumoperitoneum.

 

 No unusual calcifications. 

 

The lung bases are clear. 

 

The osseous structures are intact.

 

IMPRESSION:  

 

1.  Overall nonobstructive bowel gas pattern.

## 2022-02-18 NOTE — P.PN
Subjective


Progress Note Date: 02/18/22


Principal diagnosis: 





Respiratory failure.





Pulmonary consult dated 02/16/2022.





71-year-old female brought in from the adult foster care home, for shortness of 

breath.  She was seen in the emergency room this morning.  The patient complains

of shortness of breath, which has been going on for at least a day or so prior 

to admission, may be a bit longer.  She is not a good historian, and she is 

currently on BiPAP which makes communication and history taking a bit difficult.

 She denies any fever or chills.  She does have a bit of a cough but she is not 

producing any phlegm.  There is no chest pain or chest discomfort.  No nausea, 

vomiting, or diarrhea.  No leg swelling or edema.  Currently, the patient's on 

BiPAP at 12/6 and 100%.  The patient's getting saline at KVO.  Her coronavirus 

testing was negative.  Her most recent vital signs include a blood pressure 

135/72, heart rate 84 respiratory rate 18-20 breaths per minute, and a 

saturation of 99%.  Her chest x-ray showed only chronic changes, with nothing 

acute.  White count 15.8, hemoglobin 14, hematocrit 42.2, platelet count 2 

45,000.  Sodium 129, potassium 3.5, chlorides 95, CO2 24, anion gap 10, BUN 14, 

creatinine 0.8.  Troponin is 0.050.  N-terminal proBNP is 801.  Albumin is 3.8. 

Chest x-ray looks mostly clear.





Progress note dated 02/17/2022.





71-year-old female seen yesterday in the emergency department.  She was brought 

into the hospital from adult foster care home.  Her complaint was shortness of 

breath.  When I saw her yesterday, she was on BiPAP at 12/6 and 100%.  Today, 

she feels much better.  She states that she did not use of BiPAP last night.  

The patient is currently on 5 L nasal cannula.  She's getting saline at 20 mL an

hour.  She is not on home O2.  She does have a history of sleep apnea syndrome 

but has not used her CPAP device because apparently it was recalled.  She denies

previous tobacco use.  She apparently sees a pulmonary doctor on the outside and

I asked whether or not he given her a diagnosis but she states that she can't 

remember what it is.  She denied COPD, asthma, emphysema, chronic bronchitis, 

etc.  On her ER nadine, says she has COPD, but she states that she never smokes 

cigarettes.  She apparently does have a history of gastroesophageal reflux 

disease, hypertension, sleep apnea syndrome, and hypothyroidism.  She has not 

been using her CPAP device.  I ordered a CT angiogram yesterday, he that she 

might have a pulmonary embolism, but her computed tomography scan was negative 

for PE, and also, the lung fields were clear of any infiltrates.  Laboratory da

ta today includes a white count of 11.6, normal hemoglobin hematocrit and 

platelet count.  Electrolytes for the most part are normal.  Sodium is just a 

bit low 135.  The rest of the labs look excellent.





Progress note dated 2/18/2022.





This is a 71-year-old female again seen in her room.  She is in room 363.  

Currently, she has been weaned on 3 L nasal cannula.  She does have a history of

sleep apnea syndrome, but is not using her CPAP device because apparently it has

been recalled.  The patient came in with shortness of breath and hypoxemia, but 

the etiology is unclear.  The patient is a lifelong nontobacco user.  She denies

a prior history of any lung disease.  Other than a blood sugar of 134, no 

additional labs today.  The patient had a KUB, which showed an overall 

nonobstructive bowel gas pattern.  CT angiogram was negative for pulmonary 

embolism.  In addition, the lungs are clear and free of infiltrate.  There is no

evidence of atelectasis, pulmonary nodule, pulmonary mass.  In addition, there 

was no pleural effusion.





Objective





- Vital Signs


Vital signs: 


                                   Vital Signs











Temp  98.2 F   02/18/22 12:33


 


Pulse  82   02/18/22 16:40


 


Resp  18   02/18/22 12:33


 


BP  159/68   02/18/22 12:33


 


Pulse Ox  97   02/18/22 12:33








                                 Intake & Output











 02/17/22 02/18/22 02/18/22





 18:59 06:59 18:59


 


Intake Total 520  240


 


Output Total  450 


 


Balance 520 -450 240


 


Intake:   


 


  Oral 520  240


 


Output:   


 


  Urine  450 


 


Other:   


 


  Voiding Method Bedside Commode Bedside Commode Bedside Commode


 


  # Voids 1  














- Exam





Oriented 3, much improved today, currently on 3 L nasal cannula.  No 

conversational dyspnea or use of accessory muscles.  No audible wheezing.  

Saturations are 97%.





HEENT examination is grossly unremarkable. 





Neck supple.  Full range of motion.  No adenopathy thyromegaly or neck vein 

distention.





Cardiovascular examination reveals regular rhythm rate.  S1-S2 normal.  No S3 or

S4.  No discernible murmur noted.  Heart rate 82 bpm.





Lungs reveal improved bilateral breath sounds.  Scattered rhonchi that occurred 

yesterday had mostly dissipated.  No wheezes.  No crackles.  Saturations are 97%

on 3 L.





Abdomen is soft.  Bowel sounds are noted.  No masses or tenderness.





Extremities are intact.  No cyanosis clubbing or edema.





Skin is without rash or lesion.





Neurologic examination is brief but nonfocal.





- Labs


CBC & Chem 7: 


                                 02/17/22 07:17





                                 02/17/22 07:17


Labs: 


                  Abnormal Lab Results - Last 24 Hours (Table)











  02/17/22 02/18/22 Range/Units





  20:17 06:19 


 


POC Glucose (mg/dL)  145 H  130 H  (75-99)  mg/dL














Assessment and Plan


Assessment: 





Shortness of breath, of unclear etiology.  Pulmonary embolism has been ruled out

by CT angiogram.  No evidence of pneumonia.  The patient is a lifelong no

nsmoker.  Doubt significant COPD.





History of obstructive sleep apnea syndrome, currently not using CPAP.





History of hypertension.





History of hyperlipidemia.





History of gastroesophageal reflux disease.





History of anxiety/depression.





History of urinary incontinence.





History of osteoporosis.


Plan: 





Plan dated 02/16/2022.





The patient should be sent for a CT angiogram to rule out pulmonary embolism.  

There is a vague history of COPD although the patient apparently is a lifelong n

on-tobacco user.  She may have underlying asthma.  That does not appear to be 

particularly active at this time.  Additional recommendations and suggestions 

are forthcoming.  Prognosis is guarded.  We will continue to follow.  FiO2 can 

be weaned on the BiPAP device.





Plan dated 02/17/2022.





The patient appears to be much improved today.  I'm not sure exactly what is 

going on with this patient as she appears not have any underlying COPD.  The CT 

angiogram was negative for pulmonary embolism.  The lung fields themselves were 

clear.  The patient states that she never smoked cigarettes.  She does have a 

history of obstructive sleep apnea syndrome, but has been noncompliant with her 

CPAP device recently because his been recalled.  She denies any prior history of

any lung disease including COPD, emphysema, chronic bronchitis, asthma, etc.  

She apparently has seen a pulmonologist in the past, but she could not remember 

the doctor's name or any specific diagnosis that she was given.  I would like to

see her in the office after discharge, for complete pulmonary function testing.





Plan dated 02/18/2022.





The patient's doing much better.  She's been weaned down to 3 L.  The patient's 

saturation is 97%.  She denies any significant breathing difficulty.  A 

flatplate of the abdomen did not show anything acute.  There is no new labs 

today other than a blood sugar.  The patient's feeling much improved.  The 

patient's Pulmicort is discontinued.  In addition, Zithromax is discontinued.  

The patient will stay on the updrafts for now.  We also discontinued the Solu-

Medrol.  The patient could be considered for possible discharge in the next 24-

48 hours.  The patient should follow-up with me in the office, for pulmonary 

function testing, to determine whether or not she has any occult airways 

disease.


Time with Patient: Less than 30

## 2022-02-19 LAB
GLUCOSE BLD-MCNC: 112 MG/DL (ref 75–99)
GLUCOSE BLD-MCNC: 116 MG/DL (ref 75–99)

## 2022-02-19 RX ADMIN — HALOPERIDOL LACTATE PRN MG: 5 INJECTION, SOLUTION INTRAMUSCULAR at 00:32

## 2022-02-19 RX ADMIN — INSULIN ASPART SCH: 100 INJECTION, SOLUTION INTRAVENOUS; SUBCUTANEOUS at 12:15

## 2022-02-19 RX ADMIN — IPRATROPIUM BROMIDE AND ALBUTEROL SULFATE SCH ML: .5; 3 SOLUTION RESPIRATORY (INHALATION) at 08:02

## 2022-02-19 RX ADMIN — IPRATROPIUM BROMIDE AND ALBUTEROL SULFATE SCH ML: .5; 3 SOLUTION RESPIRATORY (INHALATION) at 11:59

## 2022-02-19 RX ADMIN — ASPIRIN 81 MG CHEWABLE TABLET SCH MG: 81 TABLET CHEWABLE at 09:00

## 2022-02-19 RX ADMIN — METOPROLOL TARTRATE SCH MG: 50 TABLET, FILM COATED ORAL at 09:00

## 2022-02-19 RX ADMIN — INSULIN ASPART SCH: 100 INJECTION, SOLUTION INTRAVENOUS; SUBCUTANEOUS at 07:18

## 2022-02-19 RX ADMIN — CEFAZOLIN SCH: 330 INJECTION, POWDER, FOR SOLUTION INTRAMUSCULAR; INTRAVENOUS at 08:55

## 2022-02-19 RX ADMIN — ATORVASTATIN CALCIUM SCH MG: 20 TABLET, FILM COATED ORAL at 20:40

## 2022-02-19 RX ADMIN — METOPROLOL TARTRATE SCH MG: 50 TABLET, FILM COATED ORAL at 20:40

## 2022-02-19 RX ADMIN — IPRATROPIUM BROMIDE AND ALBUTEROL SULFATE SCH: .5; 3 SOLUTION RESPIRATORY (INHALATION) at 15:45

## 2022-02-19 RX ADMIN — FAMOTIDINE SCH MG: 20 TABLET, FILM COATED ORAL at 20:40

## 2022-02-19 RX ADMIN — DULOXETINE SCH MG: 60 CAPSULE, DELAYED RELEASE ORAL at 09:00

## 2022-02-19 RX ADMIN — HEPARIN SODIUM SCH UNIT: 5000 INJECTION INTRAVENOUS; SUBCUTANEOUS at 09:00

## 2022-02-19 RX ADMIN — CEFAZOLIN SCH: 330 INJECTION, POWDER, FOR SOLUTION INTRAMUSCULAR; INTRAVENOUS at 01:37

## 2022-02-19 RX ADMIN — FAMOTIDINE SCH MG: 20 TABLET, FILM COATED ORAL at 09:00

## 2022-02-19 RX ADMIN — LISINOPRIL AND HYDROCHLOROTHIAZIDE SCH EACH: 25; 20 TABLET ORAL at 09:00

## 2022-02-19 RX ADMIN — HEPARIN SODIUM SCH UNIT: 5000 INJECTION INTRAVENOUS; SUBCUTANEOUS at 20:40

## 2022-02-19 RX ADMIN — IPRATROPIUM BROMIDE AND ALBUTEROL SULFATE SCH ML: .5; 3 SOLUTION RESPIRATORY (INHALATION) at 19:46

## 2022-02-19 RX ADMIN — LATANOPROST SCH DROPS: 50 SOLUTION OPHTHALMIC at 22:02

## 2022-02-19 NOTE — P.PN
Subjective


Progress Note Date: 02/19/22


Principal diagnosis: 





Respiratory failure.





Pulmonary consult dated 02/16/2022.





71-year-old female brought in from the adult foster care home, for shortness of 

breath.  She was seen in the emergency room this morning.  The patient complains

of shortness of breath, which has been going on for at least a day or so prior 

to admission, may be a bit longer.  She is not a good historian, and she is 

currently on BiPAP which makes communication and history taking a bit difficult.

 She denies any fever or chills.  She does have a bit of a cough but she is not 

producing any phlegm.  There is no chest pain or chest discomfort.  No nausea, 

vomiting, or diarrhea.  No leg swelling or edema.  Currently, the patient's on 

BiPAP at 12/6 and 100%.  The patient's getting saline at KVO.  Her coronavirus 

testing was negative.  Her most recent vital signs include a blood pressure 

135/72, heart rate 84 respiratory rate 18-20 breaths per minute, and a 

saturation of 99%.  Her chest x-ray showed only chronic changes, with nothing 

acute.  White count 15.8, hemoglobin 14, hematocrit 42.2, platelet count 2 

45,000.  Sodium 129, potassium 3.5, chlorides 95, CO2 24, anion gap 10, BUN 14, 

creatinine 0.8.  Troponin is 0.050.  N-terminal proBNP is 801.  Albumin is 3.8. 

Chest x-ray looks mostly clear.





Progress note dated 02/17/2022.





71-year-old female seen yesterday in the emergency department.  She was brought 

into the hospital from adult foster care home.  Her complaint was shortness of 

breath.  When I saw her yesterday, she was on BiPAP at 12/6 and 100%.  Today, 

she feels much better.  She states that she did not use of BiPAP last night.  

The patient is currently on 5 L nasal cannula.  She's getting saline at 20 mL an

hour.  She is not on home O2.  She does have a history of sleep apnea syndrome 

but has not used her CPAP device because apparently it was recalled.  She denies

previous tobacco use.  She apparently sees a pulmonary doctor on the outside and

I asked whether or not he given her a diagnosis but she states that she can't 

remember what it is.  She denied COPD, asthma, emphysema, chronic bronchitis, 

etc.  On her ER nadine, says she has COPD, but she states that she never smokes 

cigarettes.  She apparently does have a history of gastroesophageal reflux 

disease, hypertension, sleep apnea syndrome, and hypothyroidism.  She has not 

been using her CPAP device.  I ordered a CT angiogram yesterday, he that she 

might have a pulmonary embolism, but her computed tomography scan was negative 

for PE, and also, the lung fields were clear of any infiltrates.  Laboratory da

ta today includes a white count of 11.6, normal hemoglobin hematocrit and 

platelet count.  Electrolytes for the most part are normal.  Sodium is just a 

bit low 135.  The rest of the labs look excellent.





Progress note dated 2/18/2022.





This is a 71-year-old female again seen in her room.  She is in room 363.  

Currently, she has been weaned on 3 L nasal cannula.  She does have a history of

sleep apnea syndrome, but is not using her CPAP device because apparently it has

been recalled.  The patient came in with shortness of breath and hypoxemia, but 

the etiology is unclear.  The patient is a lifelong nontobacco user.  She denies

a prior history of any lung disease.  Other than a blood sugar of 134, no 

additional labs today.  The patient had a KUB, which showed an overall 

nonobstructive bowel gas pattern.  CT angiogram was negative for pulmonary 

embolism.  In addition, the lungs are clear and free of infiltrate.  There is no

evidence of atelectasis, pulmonary nodule, pulmonary mass.  In addition, there 

was no pleural effusion.





Progress note dated 02/19/2022.





71-year-old female, again seen in room 363.  Currently, the patient's on 3 L 

nasal cannula.  She does use BiPAP at nighttime, with settings of IPAP 12, EPAP 

6, and 50%.  She's not receiving any IV fluids.  Patient is doing relatively 

well.  The patient will need outpatient evaluation including complete pulmonary 

function test.  In addition, the patient has an established history of 

obstructive sleep apnea syndrome, and has recently not been using her CPAP 

device.  There are no new labs today other than a glucose of 112.





Objective





- Vital Signs


Vital signs: 


                                   Vital Signs











Temp  97.6 F   02/19/22 08:54


 


Pulse  76   02/19/22 12:09


 


Resp  18   02/19/22 08:54


 


BP  167/80   02/19/22 08:54


 


Pulse Ox  96   02/19/22 08:54








                                 Intake & Output











 02/18/22 02/19/22 02/19/22





 18:59 06:59 18:59


 


Intake Total 420  358


 


Output Total   991


 


Balance 420  -633


 


Intake:   


 


  Oral 420  358


 


Output:   


 


  Urine   525


 


  Post Void Residual   466


 


Other:   


 


  Voiding Method Bedside Commode Bedside Commode Bedside Commode


 


  # Voids 3 1 


 


  # Bowel Movements  1 1














- Exam





Oriented 3, much improved today, currently on 3 L nasal cannula.  No 

conversational dyspnea or use of accessory muscles.  No audible wheezing.  

Saturations are 96%.





HEENT examination is grossly unremarkable. 





Neck supple.  Full range of motion.  No adenopathy thyromegaly or neck vein 

distention.





Cardiovascular examination reveals regular rhythm rate.  S1-S2 normal.  No S3 or

S4.  No discernible murmur noted.  Heart rate 76 bpm.





Lungs reveal improved bilateral breath sounds.  Minimal scattered rhonchi.  No 

wheezes or crackles.  Breath sounds equal bilaterally.  Saturations are 96% on 3

L.





Abdomen is soft.  Bowel sounds are noted.  No masses or tenderness.





Extremities are intact.  No cyanosis clubbing or edema.





Skin is without rash or lesion.





Neurologic examination is brief but nonfocal.





- Labs


CBC & Chem 7: 


                                 02/17/22 07:17





                                 02/17/22 07:17


Labs: 


                  Abnormal Lab Results - Last 24 Hours (Table)











  02/18/22 02/18/22 02/19/22 Range/Units





  16:41 19:57 06:05 


 


POC Glucose (mg/dL)  134 H  132 H  116 H  (75-99)  mg/dL














  02/19/22 Range/Units





  11:33 


 


POC Glucose (mg/dL)  112 H  (75-99)  mg/dL














Assessment and Plan


Assessment: 





Shortness of breath, of unclear etiology.  Pulmonary embolism has been ruled out

by CT angiogram.  No evidence of pneumonia.  The patient is a lifelong 

nonsmoker.  Doubt significant COPD.





Possible restrictive lung disease secondary to obesity.





History of obstructive sleep apnea syndrome, currently not using CPAP.





History of hypertension.





History of hyperlipidemia.





History of gastroesophageal reflux disease.





History of anxiety/depression.





History of urinary incontinence.





History of osteoporosis.


Plan: 





Plan dated 02/16/2022.





The patient should be sent for a CT angiogram to rule out pulmonary embolism.  

There is a vague history of COPD although the patient apparently is a lifelong 

non-tobacco user.  She may have underlying asthma.  That does not appear to be 

particularly active at this time.  Additional recommendations and suggestions 

are forthcoming.  Prognosis is guarded.  We will continue to follow.  FiO2 can 

be weaned on the BiPAP device.





Plan dated 02/17/2022.





The patient appears to be much improved today.  I'm not sure exactly what is 

going on with this patient as she appears not have any underlying COPD.  The CT 

angiogram was negative for pulmonary embolism.  The lung fields themselves were 

clear.  The patient states that she never smoked cigarettes.  She does have a 

history of obstructive sleep apnea syndrome, but has been noncompliant with her 

CPAP device recently because his been recalled.  She denies any prior history of

any lung disease including COPD, emphysema, chronic bronchitis, asthma, etc.  

She apparently has seen a pulmonologist in the past, but she could not remember 

the doctor's name or any specific diagnosis that she was given.  I would like to

see her in the office after discharge, for complete pulmonary function testing.





Plan dated 02/18/2022.





The patient's doing much better.  She's been weaned down to 3 L.  The patient's 

saturation is 97%.  She denies any significant breathing difficulty.  A 

flatplate of the abdomen did not show anything acute.  There is no new labs 

today other than a blood sugar.  The patient's feeling much improved.  The 

patient's Pulmicort is discontinued.  In addition, Zithromax is discontinued.  

The patient will stay on the [facility] for now.  We also discontinued the Solu-

Medrol.  The patient could be considered for possible discharge in the next 24-

48 hours.  The patient should follow-up with me in the office, for pulmonary 

function testing, to determine whether or not she has any occult airways 

disease.





Plan dated 02/19/2022.





The patient's doing much better.  She's been weaned down to 3 L.  The patient 

feels like her breathing is much improved.  She did use BiPAP at 19, with 

settings of 12/6 and 50%.  The patient's not receiving any IV fluids.  The 

patient is a lifelong nonsmoker.  She denies a previous history of any pulmonary

disease.  We will continue to follow make recommendations where appropriate.  

Prognosis is guarded.  Outpatient pulmonary function testing, is essential.


Time with Patient: Less than 30

## 2022-02-19 NOTE — P.PN
Subjective





This is a pleasant 71 years old female with past medical history of hypertension

and hypothyroidism


Presents because of dyspnea and hypoxia, and EMS was saturation of 70% on room 

air and placed supine with CPAP and possible saturation up to 94% and received 1

dose of steroids.


Patient came from home, she has a legal guardian.  Patient states that her 

problems started with a cold over the weekend and to get worse but later on she 

said that her shortness of breath actually started last Monday and it was 

gradually worse and that she is on home dose of oxygen 5 L/m as she thinks.  

Also she is complaining of from cough and some creamy phlegm but no chest pain 

or abdominal pain.  No diarrhea or vomiting.  No urinary complaints, she has 

some mild headache which is improving, no weakness or numbness or double vision 

or slurred speech.





She was hypoxic on the way to the hospital at 70% on room air, currently she is 

on BiPAP was saturating 98%, mildly tachypneic at 20, afebrile.


Troponin elevated 0.05.


 chest x-ray: Chronic parenchymal changes and cardiomegaly without acute 

pulmonary process. proBNP is 801


EKG showing right bundle branch block and left anterior fascicular block with 

.


In the emergency room she was started on Zithromax, IV Solu-Medrol and normal 

saline at 100 mL per hour


Pulmonary team were consulted








02/17/2022


Patient mildly tachypneic while sitting in bed, no maternal wheezing on 

examination.  Only mild 1.


Patient says she coughs a lot with little phlegm but no chest pain.


She's been using BiPAP most of the night in the morning she was placed on 6 L 

per minute of oxygen with saturation in high 90s.


CT of the chest is negative


Pulmonary on the case


Cardiology consulted for high troponin.


Patient states that albuterol inhaler causing her shakiness and exactly and 

wanted to be switched, I checked with the pharmacy we don't carry levoalbuterol,

therefore was initiated to ipratropium as needed.





02/18/2022


A Neck and some shortness of breath, noncardiac causes per cardiologist was 

signed of the case and will see the patient on an as-needed basis.


Patient remains on 5 L oxygen, and she is remaining treated with some Medrol 60 

mg as well as Zithromax 500 mg and normal saline at 100 mL per hour with 

pulmonary team on the case.


procalcitonin is slightly elevated at 0.39.


Hemoglobin A1c is normal at 5.9%





02/19/2022


Patient is a breathing easier today.  With mild or cough.  No chest pain.


Patient also on 3-5 L/m of oxygen via nasal cannula.  At home patient states she

was not on home oxygen but using CPAP only.


She is eating well so we will discontinue IV fluids


She is on updraft.





Objective





- Vital Signs


Vital signs: 


                                   Vital Signs











Temp  97.6 F   02/19/22 08:54


 


Pulse  82   02/19/22 08:54


 


Resp  18   02/19/22 08:54


 


BP  167/80   02/19/22 08:54


 


Pulse Ox  96   02/19/22 08:54








                                 Intake & Output











 02/18/22 02/19/22 02/19/22





 18:59 06:59 18:59


 


Intake Total 420  118


 


Balance 420  118


 


Intake:   


 


  Oral 420  118


 


Other:   


 


  Voiding Method Bedside Commode Bedside Commode 


 


  # Voids 3 1 


 


  # Bowel Movements  1 














- Exam





-GENERAL: The patient is alert and oriented x3, not in any acute distress.  

Morbidly obese


HEENT: Pupils are round and equally reacting to light. EOMI. No scleral icterus.

No conjunctival pallor. Normocephalic, atraumatic. No pharyngeal erythema. No 

thyromegaly. 


CARDIOVASCULAR: S1 and S2 present. No murmurs, rubs, or gallops. 


-PULMONARY: Chest is clear to auscultation,   Mild scattered wheezing. no 

crackles


ABDOMEN: Soft, nontender, nondistended, normoactive bowel sounds. No palpable 

organomegaly. 


MUSCULOSKELETAL: No joint swelling or deformity. 


EXTREMITIES: No cyanosis, clubbing, or pedal edema. 


NEUROLOGICAL: Gross neurological examination did not reveal any focal deficits. 


SKIN: No rashes. no petechiae.





- Labs


CBC & Chem 7: 


                                 02/17/22 07:17





                                 02/17/22 07:17


Labs: 


                  Abnormal Lab Results - Last 24 Hours (Table)











  02/18/22 02/18/22 02/19/22 Range/Units





  16:41 19:57 06:05 


 


POC Glucose (mg/dL)  134 H  132 H  116 H  (75-99)  mg/dL














Assessment and Plan


Assessment: 








shortness of breath and hypoxia


Possible acute COPD exacerbation versus asthma


Elevated troponin, cardiac causes were ruled out and cardiologist signed off the

case


Acute on chronic hypoxic respiratory failure


Hyponatremia


Hypertension


History of hyperthyroidism


Obesity with BMI of 44.7








Plan: 





this is a pleasant 71 years old female who presents with dyspnea and elevated 

troponin


Continue with bronchodilator 


Continue with oxygen supply per requirement.


Pulmonary consult


Labs and medication were reviewed..  Continue same treatment.  Continue with 

symptomatic treatment.  Resume home medication.  Monitor lytes and vitals.  DVT 

and GI prophylaxis.  Further recommendations depends on the clinical course of 

the patient


DVT prophylaxis: Subcutaneous heparin


GI Prophylaxis: Pepcid


Possible discharge in 24-48 hours if she keeps improvement

## 2022-02-20 LAB
ANION GAP SERPL CALC-SCNC: 12.6 MMOL/L (ref 10–18)
BASOPHILS # BLD AUTO: 0.02 X 10*3/UL (ref 0–0.1)
BASOPHILS NFR BLD AUTO: 0.2 %
BUN SERPL-SCNC: 24.4 MG/DL (ref 9–27)
BUN/CREAT SERPL: 25.63 RATIO (ref 12–20)
CALCIUM SPEC-MCNC: 8.7 MG/DL (ref 8.7–10.3)
CHLORIDE SERPL-SCNC: 98 MMOL/L (ref 96–109)
CO2 SERPL-SCNC: 26.9 MMOL/L (ref 20–27.5)
EOSINOPHIL # BLD AUTO: 0.04 X 10*3/UL (ref 0.04–0.35)
EOSINOPHIL NFR BLD AUTO: 0.4 %
ERYTHROCYTE [DISTWIDTH] IN BLOOD BY AUTOMATED COUNT: 4.54 X 10*6/UL (ref 4.1–5.2)
ERYTHROCYTE [DISTWIDTH] IN BLOOD: 14.3 % (ref 11.5–14.5)
GLUCOSE SERPL-MCNC: 107 MG/DL (ref 70–110)
HCT VFR BLD AUTO: 42 % (ref 37.2–46.3)
HGB BLD-MCNC: 13.5 G/DL (ref 12–15)
IMM GRANULOCYTES BLD QL AUTO: 0.5 %
LYMPHOCYTES # SPEC AUTO: 2.24 X 10*3/UL (ref 0.9–5)
LYMPHOCYTES NFR SPEC AUTO: 22.2 %
MCH RBC QN AUTO: 29.7 PG (ref 27–32)
MCHC RBC AUTO-ENTMCNC: 32.1 G/DL (ref 32–37)
MCV RBC AUTO: 92.5 FL (ref 80–97)
MONOCYTES # BLD AUTO: 1.05 X 10*3/UL (ref 0.2–1)
MONOCYTES NFR BLD AUTO: 10.4 %
NEUTROPHILS # BLD AUTO: 6.7 X 10*3/UL (ref 1.8–7.7)
NEUTROPHILS NFR BLD AUTO: 66.3 %
NRBC BLD AUTO-RTO: 0 /100 WBCS (ref 0–0)
PLATELET # BLD AUTO: 217 X 10*3/UL (ref 140–440)
POTASSIUM SERPL-SCNC: 3.5 MMOL/L (ref 3.5–5.5)
SODIUM SERPL-SCNC: 137 MMOL/L (ref 135–145)
WBC # BLD AUTO: 10.1 X 10*3/UL (ref 4.5–10)

## 2022-02-20 RX ADMIN — DULOXETINE SCH MG: 60 CAPSULE, DELAYED RELEASE ORAL at 08:23

## 2022-02-20 RX ADMIN — METOPROLOL TARTRATE SCH MG: 50 TABLET, FILM COATED ORAL at 20:43

## 2022-02-20 RX ADMIN — METOPROLOL TARTRATE SCH MG: 50 TABLET, FILM COATED ORAL at 08:23

## 2022-02-20 RX ADMIN — FAMOTIDINE SCH MG: 20 TABLET, FILM COATED ORAL at 20:42

## 2022-02-20 RX ADMIN — IPRATROPIUM BROMIDE AND ALBUTEROL SULFATE SCH ML: .5; 3 SOLUTION RESPIRATORY (INHALATION) at 12:41

## 2022-02-20 RX ADMIN — IPRATROPIUM BROMIDE AND ALBUTEROL SULFATE SCH ML: .5; 3 SOLUTION RESPIRATORY (INHALATION) at 19:18

## 2022-02-20 RX ADMIN — HEPARIN SODIUM SCH UNIT: 5000 INJECTION INTRAVENOUS; SUBCUTANEOUS at 08:23

## 2022-02-20 RX ADMIN — ASPIRIN 81 MG CHEWABLE TABLET SCH MG: 81 TABLET CHEWABLE at 08:23

## 2022-02-20 RX ADMIN — HEPARIN SODIUM SCH UNIT: 5000 INJECTION INTRAVENOUS; SUBCUTANEOUS at 20:42

## 2022-02-20 RX ADMIN — IPRATROPIUM BROMIDE AND ALBUTEROL SULFATE SCH ML: .5; 3 SOLUTION RESPIRATORY (INHALATION) at 16:38

## 2022-02-20 RX ADMIN — ATORVASTATIN CALCIUM SCH MG: 20 TABLET, FILM COATED ORAL at 20:42

## 2022-02-20 RX ADMIN — FAMOTIDINE SCH MG: 20 TABLET, FILM COATED ORAL at 08:23

## 2022-02-20 RX ADMIN — LISINOPRIL AND HYDROCHLOROTHIAZIDE SCH EACH: 25; 20 TABLET ORAL at 10:18

## 2022-02-20 RX ADMIN — IPRATROPIUM BROMIDE AND ALBUTEROL SULFATE SCH ML: .5; 3 SOLUTION RESPIRATORY (INHALATION) at 09:08

## 2022-02-20 RX ADMIN — LATANOPROST SCH DROPS: 50 SOLUTION OPHTHALMIC at 20:43

## 2022-02-20 NOTE — P.PN
Subjective


Progress Note Date: 02/20/22


Principal diagnosis: 





Respiratory failure.





Pulmonary consult dated 02/16/2022.





71-year-old female brought in from the adult foster care home, for shortness of 

breath.  She was seen in the emergency room this morning.  The patient complains

of shortness of breath, which has been going on for at least a day or so prior 

to admission, may be a bit longer.  She is not a good historian, and she is 

currently on BiPAP which makes communication and history taking a bit difficult.

 She denies any fever or chills.  She does have a bit of a cough but she is not 

producing any phlegm.  There is no chest pain or chest discomfort.  No nausea, 

vomiting, or diarrhea.  No leg swelling or edema.  Currently, the patient's on 

BiPAP at 12/6 and 100%.  The patient's getting saline at KVO.  Her coronavirus 

testing was negative.  Her most recent vital signs include a blood pressure 

135/72, heart rate 84 respiratory rate 18-20 breaths per minute, and a 

saturation of 99%.  Her chest x-ray showed only chronic changes, with nothing 

acute.  White count 15.8, hemoglobin 14, hematocrit 42.2, platelet count 2 

45,000.  Sodium 129, potassium 3.5, chlorides 95, CO2 24, anion gap 10, BUN 14, 

creatinine 0.8.  Troponin is 0.050.  N-terminal proBNP is 801.  Albumin is 3.8. 

Chest x-ray looks mostly clear.





Progress note dated 02/17/2022.





71-year-old female seen yesterday in the emergency department.  She was brought 

into the hospital from adult foster care home.  Her complaint was shortness of 

breath.  When I saw her yesterday, she was on BiPAP at 12/6 and 100%.  Today, 

she feels much better.  She states that she did not use of BiPAP last night.  

The patient is currently on 5 L nasal cannula.  She's getting saline at 20 mL an

hour.  She is not on home O2.  She does have a history of sleep apnea syndrome 

but has not used her CPAP device because apparently it was recalled.  She denies

previous tobacco use.  She apparently sees a pulmonary doctor on the outside and

I asked whether or not he given her a diagnosis but she states that she can't 

remember what it is.  She denied COPD, asthma, emphysema, chronic bronchitis, 

etc.  On her ER nadine, says she has COPD, but she states that she never smokes 

cigarettes.  She apparently does have a history of gastroesophageal reflux 

disease, hypertension, sleep apnea syndrome, and hypothyroidism.  She has not 

been using her CPAP device.  I ordered a CT angiogram yesterday, he that she 

might have a pulmonary embolism, but her computed tomography scan was negative 

for PE, and also, the lung fields were clear of any infiltrates.  Laboratory da

ta today includes a white count of 11.6, normal hemoglobin hematocrit and 

platelet count.  Electrolytes for the most part are normal.  Sodium is just a 

bit low 135.  The rest of the labs look excellent.





Progress note dated 2/18/2022.





This is a 71-year-old female again seen in her room.  She is in room 363.  

Currently, she has been weaned on 3 L nasal cannula.  She does have a history of

sleep apnea syndrome, but is not using her CPAP device because apparently it has

been recalled.  The patient came in with shortness of breath and hypoxemia, but 

the etiology is unclear.  The patient is a lifelong nontobacco user.  She denies

a prior history of any lung disease.  Other than a blood sugar of 134, no 

additional labs today.  The patient had a KUB, which showed an overall 

nonobstructive bowel gas pattern.  CT angiogram was negative for pulmonary 

embolism.  In addition, the lungs are clear and free of infiltrate.  There is no

evidence of atelectasis, pulmonary nodule, pulmonary mass.  In addition, there 

was no pleural effusion.





Progress note dated 02/19/2022.





71-year-old female, again seen in room 363.  Currently, the patient's on 3 L 

nasal cannula.  She does use BiPAP at nighttime, with settings of IPAP 12, EPAP 

6, and 50%.  She's not receiving any IV fluids.  Patient is doing relatively 

well.  The patient will need outpatient evaluation including complete pulmonary 

function test.  In addition, the patient has an established history of 

obstructive sleep apnea syndrome, and has recently not been using her CPAP 

device.  There are no new labs today other than a glucose of 112.





Progress note dated 02/20/2022.





71-year-old female, again seen in room 363.  She's now been in the hospital for 

4 days.  The patient's on 3 L nasal cannula.  Saturations are 97%.  She's not 

receiving any IV fluids.  Currently, the patient states that she's feeling much 

improved.  She may have some occult airways disease.  She will need an 

outpatient evaluation, with complete pulmonary function testing.  She is a 

lifelong nonsmoker.  She does have a history of sleep apnea syndrome.  She has 

not been using her CPAP device because apparently it has been recalled and/or is

defective.  White count 10.1, hemoglobin 13.5, hematocrit 42, and platelet count

217,000.  Sodium 137, potassium 3.5, chlorides 98, CO2 27, anion gap 13, BUN 

24.4, creatinine 1.0.  Calcium is 8.7.





Objective





- Vital Signs


Vital signs: 


                                   Vital Signs











Temp  97.7 F   02/20/22 12:51


 


Pulse  76   02/20/22 12:53


 


Resp  18   02/20/22 12:51


 


BP  124/70   02/20/22 12:51


 


Pulse Ox  96   02/20/22 12:54








                                 Intake & Output











 02/19/22 02/20/22 02/20/22





 18:59 06:59 18:59


 


Intake Total 358 600 


 


Output Total 991  


 


Balance -633 600 


 


Intake:   


 


  Oral 358 600 


 


Output:   


 


  Urine 525  


 


  Post Void Residual 466  


 


Other:   


 


  Voiding Method Bedside Commode Toilet 


 


  # Voids  2 


 


  # Bowel Movements 1 1 














- Exam





Oriented 3, much improved today, currently on 3 L nasal cannula.  No conversat

ional dyspnea or use of accessory muscles.  No audible wheezing.  Saturations 

are 97%.





HEENT examination is grossly unremarkable. 





Neck supple.  Full range of motion.  No adenopathy thyromegaly or neck vein 

distention.





Cardiovascular examination reveals regular rhythm rate.  S1-S2 normal.  No S3 or

S4.  No discernible murmur noted.  Heart rate 80 bpm.





Lungs reveal improved bilateral breath sounds.  Minimal scattered rhonchi.  No 

wheezes or crackles.  Breath sounds equal bilaterally.  Saturations are 97% on 3

L.





Abdomen is soft.  Bowel sounds are noted.  No masses or tenderness.





Extremities are intact.  No cyanosis clubbing or edema.





Skin is without rash or lesion.





Neurologic examination is brief but nonfocal.





- Labs


CBC & Chem 7: 


                                 02/20/22 05:05





                                 02/20/22 05:05


Labs: 


                  Abnormal Lab Results - Last 24 Hours (Table)











  02/20/22 02/20/22 Range/Units





  05:05 05:05 


 


WBC  10.10 H   (4.50-10.00)  X 10*3/uL


 


Immature Gran #  0.05 H   (0.00-0.04)  X 10*3/uL


 


Monocytes #  1.05 H   (0.20-1.00)  X 10*3/uL


 


BUN/Creatinine Ratio   25.63 H  (12.00-20.00)  Ratio














Assessment and Plan


Assessment: 





Shortness of breath, of unclear etiology.  Pulmonary embolism has been ruled out

by CT angiogram.  No evidence of pneumonia.  The patient is a lifelong no

nsmoker.  Doubt significant COPD. May have occult airways disease.





Possible restrictive lung disease secondary to obesity.





History of obstructive sleep apnea syndrome, currently not using CPAP.





History of hypertension.





History of hyperlipidemia.





History of gastroesophageal reflux disease.





History of anxiety/depression.





History of urinary incontinence.





History of osteoporosis.


Plan: 





Plan dated 02/16/2022.





The patient should be sent for a CT angiogram to rule out pulmonary embolism.  

There is a vague history of COPD although the patient apparently is a lifelong 

non-tobacco user.  She may have underlying asthma.  That does not appear to be 

particularly active at this time.  Additional recommendations and suggestions 

are forthcoming.  Prognosis is guarded.  We will continue to follow.  FiO2 can 

be weaned on the BiPAP device.





Plan dated 02/17/2022.





The patient appears to be much improved today.  I'm not sure exactly what is 

going on with this patient as she appears not have any underlying COPD.  The CT 

angiogram was negative for pulmonary embolism.  The lung fields themselves were 

clear.  The patient states that she never smoked cigarettes.  She does have a 

history of obstructive sleep apnea syndrome, but has been noncompliant with her 

CPAP device recently because his been recalled.  She denies any prior history of

any lung disease including COPD, emphysema, chronic bronchitis, asthma, etc.  

She apparently has seen a pulmonologist in the past, but she could not remember 

the doctor's name or any specific diagnosis that she was given.  I would like to

see her in the office after discharge, for complete pulmonary function testing.





Plan dated 02/18/2022.





The patient's doing much better.  She's been weaned down to 3 L.  The patient's 

saturation is 97%.  She denies any significant breathing difficulty.  A 

flatplate of the abdomen did not show anything acute.  There is no new labs 

today other than a blood sugar.  The patient's feeling much improved.  The 

patient's Pulmicort is discontinued.  In addition, Zithromax is discontinued.  

The patient will stay on the [facility] for now.  We also discontinued the Solu-

Medrol.  The patient could be considered for possible discharge in the next 24-

48 hours.  The patient should follow-up with me in the office, for pulmonary 

function testing, to determine whether or not she has any occult airways 

disease.





Plan dated 02/19/2022.





The patient's doing much better.  She's been weaned down to 3 L.  The patient 

feels like her breathing is much improved.  She did use BiPAP at 19, with 

settings of 12/6 and 50%.  The patient's not receiving any IV fluids.  The 

patient is a lifelong nonsmoker.  She denies a previous history of any pulmonary

disease.  We will continue to follow make recommendations where appropriate.  

Prognosis is guarded.  Outpatient pulmonary function testing, is essential.





Plan dated 02/20/2022.





Patient is doing much better.  She's been weaned down to 3 L nasal cannula.  She

is using BiPAP at nighttime, with settings of 12/6 and 50%.  The patient is a 

lifelong nonsmoker.  She may have occult airways disease, or restrictive lung 

disease secondary to obesity.  She will need outpatient pulmonary function 

testing.  Labs are reviewed.  The patient continues to improve.  Hopeful disc

harge in the near future.  Additional recommendations and suggestions are 

forthcoming.  Prognosis is guarded.


Time with Patient: Less than 30

## 2022-02-20 NOTE — P.PN
Subjective





This is a pleasant 71 years old female with past medical history of hypertension

and hypothyroidism


Presents because of dyspnea and hypoxia, and EMS was saturation of 70% on room 

air and placed supine with CPAP and possible saturation up to 94% and received 1

dose of steroids.


Patient came from home, she has a legal guardian.  Patient states that her 

problems started with a cold over the weekend and to get worse but later on she 

said that her shortness of breath actually started last Monday and it was 

gradually worse and that she is on home dose of oxygen 5 L/m as she thinks.  

Also she is complaining of from cough and some creamy phlegm but no chest pain 

or abdominal pain.  No diarrhea or vomiting.  No urinary complaints, she has 

some mild headache which is improving, no weakness or numbness or double vision 

or slurred speech.





She was hypoxic on the way to the hospital at 70% on room air, currently she is 

on BiPAP was saturating 98%, mildly tachypneic at 20, afebrile.


Troponin elevated 0.05.


 chest x-ray: Chronic parenchymal changes and cardiomegaly without acute 

pulmonary process. proBNP is 801


EKG showing right bundle branch block and left anterior fascicular block with 

.


In the emergency room she was started on Zithromax, IV Solu-Medrol and normal 

saline at 100 mL per hour


Pulmonary team were consulted








02/17/2022


Patient mildly tachypneic while sitting in bed, no maternal wheezing on 

examination.  Only mild 1.


Patient says she coughs a lot with little phlegm but no chest pain.


She's been using BiPAP most of the night in the morning she was placed on 6 L 

per minute of oxygen with saturation in high 90s.


CT of the chest is negative


Pulmonary on the case


Cardiology consulted for high troponin.


Patient states that albuterol inhaler causing her shakiness and exactly and 

wanted to be switched, I checked with the pharmacy we don't carry levoalbuterol,

therefore was initiated to ipratropium as needed.





02/18/2022


A Neck and some shortness of breath, noncardiac causes per cardiologist was 

signed of the case and will see the patient on an as-needed basis.


Patient remains on 5 L oxygen, and she is remaining treated with some Medrol 60 

mg as well as Zithromax 500 mg and normal saline at 100 mL per hour with 

pulmonary team on the case.


procalcitonin is slightly elevated at 0.39.


Hemoglobin A1c is normal at 5.9%





02/19/2022


Patient is a breathing easier today.  With mild or cough.  No chest pain.


Patient also on 3-5 L/m of oxygen via nasal cannula.  At home patient states she

was not on home oxygen but using CPAP only.


She is eating well so we will discontinue IV fluids


She is on updraft.





02/20/2022


She was doing better, no dyspnea, she is saturating 97% on room air.  No other 

complaints.


Pulmonary team recommended outpatient pulmonary function test last night she has

.


some urinary retention about 413 and she got straight cath, we will monitor 

bladder scan.  However then it was checked twice and it was not elevated, and 

check postvoid residual was only 30 mL


Her CBC and BMP are reviewed the looks stable, vitals are stable.


We will check physical therapy


Discontinue IV fluids


Possible discharge in 24-48 hours





Objective





- Vital Signs


Vital signs: 


                                   Vital Signs











Temp  98.6 F   02/20/22 10:20


 


Pulse  70   02/20/22 10:20


 


Resp  16   02/20/22 10:20


 


BP  129/70   02/20/22 10:20


 


Pulse Ox  97   02/20/22 10:20








                                 Intake & Output











 02/19/22 02/20/22 02/20/22





 18:59 06:59 18:59


 


Intake Total 358 600 


 


Output Total 991  


 


Balance -633 600 


 


Intake:   


 


  Oral 358 600 


 


Output:   


 


  Urine 525  


 


  Post Void Residual 466  


 


Other:   


 


  Voiding Method Bedside Commode Toilet 


 


  # Voids  2 


 


  # Bowel Movements 1 1 














- Exam





-GENERAL: The patient is alert and oriented x3, not in any acute distress.  

Morbidly obese


HEENT: Pupils are round and equally reacting to light. EOMI. No scleral icterus.

No conjunctival pallor. Normocephalic, atraumatic. No pharyngeal erythema. No 

thyromegaly. 


CARDIOVASCULAR: S1 and S2 present. No murmurs, rubs, or gallops. 


-PULMONARY: Chest is clear to auscultation,   Mild scattered wheezing. no 

crackles


ABDOMEN: Soft, nontender, nondistended, normoactive bowel sounds. No palpable 

organomegaly. 


MUSCULOSKELETAL: No joint swelling or deformity. 


EXTREMITIES: No cyanosis, clubbing, or pedal edema. 


NEUROLOGICAL: Gross neurological examination did not reveal any focal deficits. 


SKIN: No rashes. no petechiae.





- Labs


CBC & Chem 7: 


                                 02/20/22 05:05





                                 02/20/22 05:05


Labs: 


                  Abnormal Lab Results - Last 24 Hours (Table)











  02/19/22 02/20/22 02/20/22 Range/Units





  11:33 05:05 05:05 


 


WBC   10.10 H   (4.50-10.00)  X 10*3/uL


 


Immature Gran #   0.05 H   (0.00-0.04)  X 10*3/uL


 


Monocytes #   1.05 H   (0.20-1.00)  X 10*3/uL


 


BUN/Creatinine Ratio    25.63 H  (12.00-20.00)  Ratio


 


POC Glucose (mg/dL)  112 H    (75-99)  mg/dL














Assessment and Plan


Assessment: 








shortness of breath and hypoxia.  Significantly improved


Possible acute COPD exacerbation versus asthma


Elevated troponin, cardiac causes were ruled out and cardiologist signed off the

case


Acute on chronic hypoxic respiratory failure


Hyponatremia


Hypertension


History of hyperthyroidism


Obesity with BMI of 44.7








Plan: 





this is a pleasant 71 years old female who presents with dyspnea and elevated 

troponin


Continue with bronchodilator 


Continue with oxygen supply per requirement.


Pulmonary consult


Labs and medication were reviewed..  Continue same treatment.  Continue with 

symptomatic treatment.  Resume home medication.  Monitor lytes and vitals.  DVT 

and GI prophylaxis.  Further recommendations depends on the clinical course of 

the patient


DVT prophylaxis: Subcutaneous heparin


GI Prophylaxis: Pepcid


Possible discharge in 24-48 hours if she keeps improvement

## 2022-02-21 LAB — GLUCOSE BLD-MCNC: 104 MG/DL (ref 75–99)

## 2022-02-21 RX ADMIN — METOPROLOL TARTRATE SCH MG: 50 TABLET, FILM COATED ORAL at 07:47

## 2022-02-21 RX ADMIN — LISINOPRIL AND HYDROCHLOROTHIAZIDE SCH EACH: 25; 20 TABLET ORAL at 07:46

## 2022-02-21 RX ADMIN — ASPIRIN 81 MG CHEWABLE TABLET SCH MG: 81 TABLET CHEWABLE at 07:46

## 2022-02-21 RX ADMIN — ATORVASTATIN CALCIUM SCH MG: 20 TABLET, FILM COATED ORAL at 19:46

## 2022-02-21 RX ADMIN — DULOXETINE SCH MG: 60 CAPSULE, DELAYED RELEASE ORAL at 07:46

## 2022-02-21 RX ADMIN — IPRATROPIUM BROMIDE AND ALBUTEROL SULFATE SCH ML: .5; 3 SOLUTION RESPIRATORY (INHALATION) at 21:46

## 2022-02-21 RX ADMIN — LATANOPROST SCH DROPS: 50 SOLUTION OPHTHALMIC at 19:46

## 2022-02-21 RX ADMIN — HEPARIN SODIUM SCH UNIT: 5000 INJECTION INTRAVENOUS; SUBCUTANEOUS at 07:47

## 2022-02-21 RX ADMIN — FAMOTIDINE SCH MG: 20 TABLET, FILM COATED ORAL at 07:46

## 2022-02-21 RX ADMIN — IPRATROPIUM BROMIDE AND ALBUTEROL SULFATE SCH ML: .5; 3 SOLUTION RESPIRATORY (INHALATION) at 12:12

## 2022-02-21 RX ADMIN — FAMOTIDINE SCH MG: 20 TABLET, FILM COATED ORAL at 19:46

## 2022-02-21 RX ADMIN — METOPROLOL TARTRATE SCH MG: 50 TABLET, FILM COATED ORAL at 19:46

## 2022-02-21 RX ADMIN — IPRATROPIUM BROMIDE AND ALBUTEROL SULFATE SCH ML: .5; 3 SOLUTION RESPIRATORY (INHALATION) at 08:49

## 2022-02-21 RX ADMIN — HEPARIN SODIUM SCH UNIT: 5000 INJECTION INTRAVENOUS; SUBCUTANEOUS at 19:46

## 2022-02-21 RX ADMIN — IPRATROPIUM BROMIDE AND ALBUTEROL SULFATE SCH ML: .5; 3 SOLUTION RESPIRATORY (INHALATION) at 17:39

## 2022-02-21 RX ADMIN — FAMOTIDINE SCH MG: 20 TABLET, FILM COATED ORAL at 07:47

## 2022-02-21 NOTE — CDI
Documentation Clarification Form



Date: 02/21/2022 10:40:51 AM

From: Radha Greco RN, CCDS

Phone: [phone]

MRN: [id number]

Admit Date: 02/16/2022 07:36:00 AM

Patient Name: Kim Melendez

Visit Number: [id number]

Discharge Date:  





ATTENTION: The Clinical Documentation Specialists (CDI) and [facility] Coding Staff 
appreciate your assistance in clarifying documentation. Please respond to the 
clarification below the line at the bottom and electronically sign. The CDI & 
[facility] Coding staff will review the response and follow-up if needed. Please note: 
Queries are made part of the Legal Health Record. If you have any questions, 
please contact the author of this message via ITS.



Dr. Cabrera E Zbigniew





Asthma is documented in the progress notes starting on 2/18/22. Additional 
clarification is requested for the type and acuity of asthma.



History/risk factors: COPD, Hypertension, Thyroid Disorder, 



Clinical Indicators: 71-year-old female present to ED with respiratory distress,
wheezes, accessory muscle use, decreased breath sounds, prolonged expiratory per
ED respiratory exam. In the progress asthma is documented. 

2/16 CXR: Chronic parenchymal changes and cardiomegaly without acute pulmonary 
process.

2/16 Vital signs: 155/90 85 28 97.7 98 % BIPAP @ FIO2 100% 



Treatment:

Monitor O2 Sat's (titrate)

Duoneb 0.5 MG-3MG/3MLSOL Inhalation QID 2/17-

Pulmicort .5 MG Inhalation BID 2/16-2/18

Solu-Medrol 60 MG IV Q6 HRS 2/16-2/18





Please clarify the type and severity of asthma, if known:

[  ] Mild intermittent asthma

   [  ] with exacerbation

   [  ] without exacerbation

[  ] Mild persistent asthma

   [  ] with exacerbation

   [  ] without exacerbation

[  ] Moderate persistent asthma

   [  ] with exacerbation

   [  ] without exacerbation

[  ] Severe persistent asthma

   [  ] with exacerbation

   [  ] without exacerbation

[  ]  Other, please specify ____

[  ]  Unable to determine

 

(Template Last Revised: March 2021)

___________________________________________________________________________

Unable to determine

MTDD

## 2022-02-21 NOTE — P.PN
Subjective


Progress Note Date: 02/21/22


Principal diagnosis: 


 Shortness of breath





 On 02/21/2022 patient seen in follow-up on medical surgical floor, she is 

sitting up in the recliner, breathing comfortably, in no acute distress, 

currently on room air, earlier today she was satting 97% on 3 L, lung sounds are

clear, no rhonchi or wheezing, no cough, she states overall she just feels weak,

but no dyspnea, no cough, no worsening shortness of breath.  She's had no acute 

events overnight.  Room air pulse ox with exercise was 88% and patient does 

qualify for home oxygen.  No fever or chills.  A chest x-ray was negative for 

any acute cardiopulmonary process, chest  CTA angiogram was negative for any 

pulmonary embolism, and lungs were clear and free of infiltrate, no atelectasis,

no pulmonary nodules, no pleural effusions.  Patient tested negative for COVID-

19.  Today's labs have been reviewed her white blood cell count is 10.1, 

hemoglobin is 13.5 electrolyte and renal profile were unremarkable.








Objective





- Vital Signs


Vital signs: 


                                   Vital Signs











Temp  97.9 F   02/21/22 08:00


 


Pulse  84   02/21/22 12:23


 


Resp  16   02/21/22 08:00


 


BP  126/77   02/21/22 08:00


 


Pulse Ox  90 L  02/21/22 09:18








                                 Intake & Output











 02/20/22 02/21/22 02/21/22





 18:59 06:59 18:59


 


Intake Total   296


 


Balance   296


 


Intake:   


 


  Oral   296


 


Other:   


 


  # Voids 5 2 














- Exam


 GENERAL EXAM: Alert, very pleasant, 71-year-old obese white female, sitting up 

in the chair, breathing comfortably on room air she satting 97% comfortable in 

no apparent distress.


HEAD: Normocephalic/atraumatic.


EYES: Normal reaction of pupils, equal size.  Conjunctiva pink, sclera white.


NOSE: Clear with pink turbinates.


THROAT: No erythema or exudates.


NECK: No masses, no JVD, no thyroid enlargement, no adenopathy.


CHEST: No chest wall deformity.  Symmetrical expansion. 


LUNGS: Equal air entry with no crackles, wheeze, rhonchi or dullness.


CVS: Regular rate and rhythm, normal S1 and S2, no gallops, no murmurs, no rubs


ABDOMEN: Soft, nontender.  No hepatosplenomegaly, normal bowel sounds, no 

guarding or rigidity.


EXTREMITIES: No clubbing, no edema, no cyanosis, 2+ pulses and upper and lower 

extremities.


MUSCULOSKELETAL: Muscle strength and tone normal.


SPINE: No scoliosis or deformity


SKIN: No rashes


CENTRAL NERVOUS SYSTEM: Alert and oriented -3.  No focal deficits, tone is 

normal in all 4 extremities.


PSYCHIATRIC: Alert and oriented -3.  Appropriate affect.  Intact judgment and 

insight.











- Labs


CBC & Chem 7: 


                                 02/20/22 05:05





                                 02/20/22 05:05





Assessment and Plan


Plan: 


 Assessment:





#1.  Shortness of breath, of unclear etiology, improved.  Chest x-ray was 

negative for acute cardiopulmonary process, CTA chest ruled out pulmonary 

embolism, lung windows did not show evidence of pneumonia, CHF, or pulmonary 

nodules or masses.  Patient is a lifelong nonsmoker, no evidence of COPD.  

Shortness of breath may have been related to restrictive lung disease, and [place] 

[place] airways disease





#2.  Possibly restrictive lung disease secondary to obesity





#3.  History of obstructive sleep apnea currently not using CPAP





#4.  History of hypertension





#5.  History of hyperlipidemia





#6.  History of GERD





#7.  History of anxiety and depression





#8.  History of urinary incontinence





#9.  History of osteoporosis





Plan:





Patient denies any worsening dyspnea, no cough, vital signs have been stable


She did qualify for home oxygen


No acute events overnight


She just complains of some weakness


Physical therapy to evaluate and make a recommendation regarding discharge 

planning


From pulmonary respect patient is stable for discharge today





I performed a history & physical examination of the patient and discussed their 

management with my nurse practitioner, Betzaida Burgos.  I reviewed the nurse 

practitioner's note and agree with the documented findings and plan of care.  

Lung sounds are positive for dim breath sounds  throughout the lung fields.  The

findings and the impression was discussed with the patient.  I attest to the 

documentation by the nurse practitioner. 





Time with Patient: Less than 30

## 2022-02-21 NOTE — P.PN
Subjective





This is a pleasant 71 years old female with past medical history of hypertension

and hypothyroidism


Presents because of dyspnea and hypoxia, and EMS was saturation of 70% on room 

air and placed supine with CPAP and possible saturation up to 94% and received 1

dose of steroids.


Patient came from home, she has a legal guardian.  Patient states that her 

problems started with a cold over the weekend and to get worse but later on she 

said that her shortness of breath actually started last Monday and it was 

gradually worse and that she is on home dose of oxygen 5 L/m as she thinks.  

Also she is complaining of from cough and some creamy phlegm but no chest pain 

or abdominal pain.  No diarrhea or vomiting.  No urinary complaints, she has 

some mild headache which is improving, no weakness or numbness or double vision 

or slurred speech.





She was hypoxic on the way to the hospital at 70% on room air, currently she is 

on BiPAP was saturating 98%, mildly tachypneic at 20, afebrile.


Troponin elevated 0.05.


 chest x-ray: Chronic parenchymal changes and cardiomegaly without acute 

pulmonary process. proBNP is 801


EKG showing right bundle branch block and left anterior fascicular block with 

.


In the emergency room she was started on Zithromax, IV Solu-Medrol and normal 

saline at 100 mL per hour


Pulmonary team were consulted








02/17/2022


Patient mildly tachypneic while sitting in bed, no maternal wheezing on 

examination.  Only mild 1.


Patient says she coughs a lot with little phlegm but no chest pain.


She's been using BiPAP most of the night in the morning she was placed on 6 L 

per minute of oxygen with saturation in high 90s.


CT of the chest is negative


Pulmonary on the case


Cardiology consulted for high troponin.


Patient states that albuterol inhaler causing her shakiness and exactly and 

wanted to be switched, I checked with the pharmacy we don't carry levoalbuterol,

therefore was initiated to ipratropium as needed.





02/18/2022


A Neck and some shortness of breath, noncardiac causes per cardiologist was 

signed of the case and will see the patient on an as-needed basis.


Patient remains on 5 L oxygen, and she is remaining treated with some Medrol 60 

mg as well as Zithromax 500 mg and normal saline at 100 mL per hour with 

pulmonary team on the case.


procalcitonin is slightly elevated at 0.39.


Hemoglobin A1c is normal at 5.9%





02/19/2022


Patient is a breathing easier today.  With mild or cough.  No chest pain.


Patient also on 3-5 L/m of oxygen via nasal cannula.  At home patient states she

was not on home oxygen but using CPAP only.


She is eating well so we will discontinue IV fluids


She is on updraft.





02/20/2022


She was doing better, no dyspnea, she is saturating 97% on room air.  No other 

complaints.


Pulmonary team recommended outpatient pulmonary function test last night she has

.


some urinary retention about 413 and she got straight cath, we will monitor 

bladder scan.  However then it was checked twice and it was not elevated, and 

check postvoid residual was only 30 mL


Her CBC and BMP are reviewed the looks stable, vitals are stable.


We will check physical therapy


Discontinue IV fluids


Possible discharge in 24-48 hours





02/21/2022


Patient is denying any specific symptoms, she is at baseline.  She has regular 

bowel movements.  No abdominal pain, no nausea or vomiting.  No urinary 

complaints.  No respiratory complaints like no dyspnea or coughing or chest 

pain.  However when she assessed she qualify for home oxygen.  Patient is 

cleared for discharge by the pulmonary service


Patient is medically stable for discharge pending placement











Objective





- Vital Signs


Vital signs: 


                                   Vital Signs











Temp  98.4 F   02/21/22 13:32


 


Pulse  81   02/21/22 13:32


 


Resp  16   02/21/22 08:00


 


BP  136/76   02/21/22 13:32


 


Pulse Ox  94 L  02/21/22 13:32








                                 Intake & Output











 02/20/22 02/21/22 02/21/22





 18:59 06:59 18:59


 


Intake Total   296


 


Balance   296


 


Intake:   


 


  Oral   296


 


Other:   


 


  # Voids 5 2 














- Exam





-GENERAL: The patient is alert and oriented x3, not in any acute distress.  

Morbidly obese


HEENT: Pupils are round and equally reacting to light. EOMI. No scleral icterus.

No conjunctival pallor. Normocephalic, atraumatic. No pharyngeal erythema. No 

thyromegaly. 


CARDIOVASCULAR: S1 and S2 present. No murmurs, rubs, or gallops. 


-PULMONARY: Chest is clear to auscultation,   Mild scattered wheezing. no 

crackles


ABDOMEN: Soft, nontender, nondistended, normoactive bowel sounds. No palpable 

organomegaly. 


MUSCULOSKELETAL: No joint swelling or deformity. 


EXTREMITIES: No cyanosis, clubbing, or pedal edema. 


NEUROLOGICAL: Gross neurological examination did not reveal any focal deficits. 


SKIN: No rashes. no petechiae.





- Labs


CBC & Chem 7: 


                                 02/20/22 05:05





                                 02/20/22 05:05





Assessment and Plan


Assessment: 








shortness of breath and hypoxia.  Significantly improved


Possible acute COPD exacerbation versus asthma


Elevated troponin, cardiac causes were ruled out and cardiologist signed off the

case


Acute on chronic hypoxic respiratory failure


Hyponatremia


Hypertension


History of hyperthyroidism


Obesity with BMI of 44.7








Plan: 





this is a pleasant 71 years old female who presents with dyspnea and elevated 

troponin


Continue with bronchodilator 


Continue with oxygen supply per requirement.


Pulmonary consult


Labs and medication were reviewed..  Continue same treatment.  Continue with 

symptomatic treatment.  Resume home medication.  Monitor lytes and vitals.  DVT 

and GI prophylaxis.  Further recommendations depends on the clinical course of 

the patient


DVT prophylaxis: Subcutaneous heparin


GI Prophylaxis: Pepcid


Patient is medically stable for discharge pending placement

## 2022-02-22 VITALS — SYSTOLIC BLOOD PRESSURE: 91 MMHG | DIASTOLIC BLOOD PRESSURE: 57 MMHG | TEMPERATURE: 98.4 F

## 2022-02-22 VITALS — RESPIRATION RATE: 18 BRPM

## 2022-02-22 VITALS — HEART RATE: 75 BPM

## 2022-02-22 RX ADMIN — IPRATROPIUM BROMIDE AND ALBUTEROL SULFATE SCH ML: .5; 3 SOLUTION RESPIRATORY (INHALATION) at 15:42

## 2022-02-22 RX ADMIN — HEPARIN SODIUM SCH UNIT: 5000 INJECTION INTRAVENOUS; SUBCUTANEOUS at 07:04

## 2022-02-22 RX ADMIN — METOPROLOL TARTRATE SCH MG: 50 TABLET, FILM COATED ORAL at 07:04

## 2022-02-22 RX ADMIN — DULOXETINE SCH MG: 60 CAPSULE, DELAYED RELEASE ORAL at 07:03

## 2022-02-22 RX ADMIN — IPRATROPIUM BROMIDE AND ALBUTEROL SULFATE SCH ML: .5; 3 SOLUTION RESPIRATORY (INHALATION) at 11:58

## 2022-02-22 RX ADMIN — FAMOTIDINE SCH MG: 20 TABLET, FILM COATED ORAL at 07:04

## 2022-02-22 RX ADMIN — ASPIRIN 81 MG CHEWABLE TABLET SCH MG: 81 TABLET CHEWABLE at 07:03

## 2022-02-22 RX ADMIN — LISINOPRIL AND HYDROCHLOROTHIAZIDE SCH EACH: 25; 20 TABLET ORAL at 07:03

## 2022-02-22 RX ADMIN — IPRATROPIUM BROMIDE AND ALBUTEROL SULFATE SCH ML: .5; 3 SOLUTION RESPIRATORY (INHALATION) at 08:39

## 2022-02-22 NOTE — P.DS
Providers


Date of admission: 


02/16/22 07:36





Attending physician: 


Birgit Brooks





Consults: 





                                        





02/16/22 07:36


Consult Physician Urgent 


   Consulting Provider: Allen Lucero


   Consult Reason/Comments: COPD exacerbation, requiring BiPAP


   Do you want consulting provider notified?: Yes





02/16/22 07:57


Consult Physician Urgent 


   Consulting Provider: Luc Fitzpatrick


   Consult Reason/Comments: Elevated troponin


   Do you want consulting provider notified?: Yes











Primary care physician: 


[facility]





Hospital Course: 





Diagnoses:


shortness of breath and hypoxia.  Significantly improved.  Unknown etiology 

could be asthma


Possible acute COPD exacerbation versus asthma


Elevated troponin, cardiac causes were ruled out and cardiologist signed off the

case


Acute on chronic hypoxic respiratory failure


Hyponatremia


Hypertension


History of hyperthyroidism


Obesity with BMI of 44.7





Hospital course:


This is a pleasant 71 years old female with past medical history of hypertension

and hypothyroidism


Presents because of dyspnea and hypoxia, and EMS was saturation of 70% on room 

air .  Patient was admitted with a provisional diagnosis of acute hypoxic 

respiratory failure of unclear etiology, asthma is suspected over COPD and pa

josette was treated with Solu-Medrol and received a short course of oral 

antibiotics of Zithromax and as well as IV fluid, patient gradually short 

interval improvement


However by 2/18, both antibiotics and steroids were discontinued and patient 

monitor closely with updraft only and she did well and gradually improved and 

today she did not qualify for home oxygen as her pulmonary function was at base

line.


Pulmonary team were already on the case and the cleared her for discharge since 

yesterday, she could not go yesterday for placement issue as her adult foster 

care home was out of poor.


Today she is awake and alert, pleasant, she denies dyspnea or chest pain.  No 

coughing.  No abdominal pain or vomiting.  Tolerates diet well.  No change in 

urine or bowel habits.  No fever


Patient states she can go home today.


Problems and management plan were discussed with the patient and he verbalized 

understanding and acceptance


Patient was found stable and can be discharged home however he needs follow-up 

as an outpatient. Patient [facility] one week and patient agrees


Patient was instructed to follow up with pulmonologist Dr. Edwards 3/17 and she 

agrees


Also instructed to follow up with cardiologist Dr. Goodrich in 2 weeks and she 

agrees








Physical exam


Gen: patient is a AAOx3, no distress


CVS: S1-S2, RRR, no murmur


Lungs: B/L CTA, no wheezing


Abdomen: soft, no distention, no tenderness, positive bowel sounds


Extremity: no leg edema or induration





Time spent more than 35 minutes











Plan - Discharge Summary


Discharge Rx Participant: No


New Discharge Prescriptions: 


Continue


   Ergocalciferol [Vitamin D2 (1250 Mcg = 11024 Iu)] 1,250 mcg PO Q14D


   Calcium Carbonate/Vitamin D3 [Calcium 500 mg-Vit D3 5 mcg (200 Unit)] 1 tab 

PO BID


   lamoTRIgine [LaMICtal] 100 mg PO BID


   Ipratropium/Albuter 20-100Mcg [Combivent Respimat 20-100Mcg Inhaler] 1 puff 

INHALATION RT-QID PRN


     PRN Reason: Shortness Of Breath


   Metoprolol Tartrate [Lopressor] 50 mg PO BID


   Lisinopril-Hctz 20-25 mg [Zestoretic 20-25] 1 tab PO DAILY


   Albuterol Sulfate [Albuterol Sulfate Hfa] 2 puff PO RT-Q8H PRN #1 inh


     PRN Reason: Shortness Of Breath


   Omeprazole [PriLOSEC] 40 mg PO DAILY


   lamoTRIgine [LaMICtal] 200 mg PO BID


   Oxybutynin Chloride [Ditropan] 5 mg PO TID


   Acetaminophen Tab [Tylenol] 500 mg PO Q4H PRN MDD 6 TABS


     PRN Reason: Pain Or Fever > 100.5


   QUEtiapine FUMARATE [SEROquel] 300 mg PO HS


   Latanoprost [Xalatan 0.005%] 1 drop BOTH EYES HS


   DULoxetine HCL [Cymbalta] 60 mg PO DAILY


   Simvastatin [Zocor] 40 mg PO HS


   Carboxymethylcell/Glycerin/Pf [Refresh Relieva Pf 0.5-1% Drop] 1 drop BOTH 

EYES QID


   Fluticasone/Salmeterol [Advair 100-50 Diskus] 1 inhalation PO RT-BID


   Alendronate Sodium 70 mg PO Q7D


   Furosemide [Lasix] 10 mg PO DAILY


   Felodipine [Felodipine ER] 10 mg PO BID


   Potassium Chloride [Klor-Con 10 ER] 10 meq PO Q48H


Discharge Medication List





Acetaminophen Tab [Tylenol] 500 mg PO Q4H PRN MDD 6 TABS 02/16/22 [History]


Alendronate Sodium 70 mg PO Q7D 02/16/22 [History]


Calcium Carbonate/Vitamin D3 [Calcium 500 mg-Vit D3 5 mcg (200 Unit)] 1 tab PO 

BID 02/16/22 [History]


Carboxymethylcell/Glycerin/Pf [Refresh Relieva Pf 0.5-1% Drop] 1 drop BOTH EYES 

QID 02/16/22 [History]


DULoxetine HCL [Cymbalta] 60 mg PO DAILY 02/16/22 [History]


Ergocalciferol [Vitamin D2 (1250 Mcg = 36200 Iu)] 1,250 mcg PO Q14D 02/16/22 

[History]


Felodipine [Felodipine ER] 10 mg PO BID 02/16/22 [History]


Fluticasone/Salmeterol [Advair 100-50 Diskus] 1 inhalation PO RT-BID 02/16/22 

[History]


Furosemide [Lasix] 10 mg PO DAILY 02/16/22 [History]


Ipratropium/Albuter 20-100Mcg [Combivent Respimat 20-100Mcg Inhaler] 1 puff 

INHALATION RT-QID PRN 02/16/22 [History]


Latanoprost [Xalatan 0.005%] 1 drop BOTH EYES HS 02/16/22 [History]


Lisinopril-Hctz 20-25 mg [Zestoretic 20-25] 1 tab PO DAILY 02/16/22 [History]


Metoprolol Tartrate [Lopressor] 50 mg PO BID 02/16/22 [History]


Omeprazole [PriLOSEC] 40 mg PO DAILY 02/16/22 [History]


Oxybutynin Chloride [Ditropan] 5 mg PO TID 02/16/22 [History]


Potassium Chloride [Klor-Con 10 ER] 10 meq PO Q48H 02/16/22 [History]


QUEtiapine FUMARATE [SEROquel] 300 mg PO HS 02/16/22 [History]


Simvastatin [Zocor] 40 mg PO HS 02/16/22 [History]


lamoTRIgine [LaMICtal] 100 mg PO BID 02/16/22 [History]


lamoTRIgine [LaMICtal] 200 mg PO BID 02/16/22 [History]


Albuterol Sulfate [Albuterol Sulfate Hfa] 2 puff PO RT-Q8H PRN #1 inh 02/22/22 

[Rx]








Follow up Appointment(s)/Referral(s): 


Allen Lucero DO [Doctor of Osteopathic Medicine] - 03/17/22 1:00 pm (Lung 

doctor, you will need outpatient lung function test)


Amrik Goodrich MD [STAFF PHYSICIAN] - 2 Weeks (Cardiologist)


[facility] [Primary Care Provider] - 1-2 days


Patient Instructions/Handouts:  COPD (Chronic Obstructive Pulmonary Disease) 

(DC)


Activity/Diet/Wound Care/Special Instructions: 


***Please call [facility] ([phone]) when discharged to arrange transportation back

to Group Home****








Heart healthy diet





Activity is restricted until you see your doctor


Discharge Disposition: HOME SELF-CARE

## 2022-09-13 ENCOUNTER — HOSPITAL ENCOUNTER (OUTPATIENT)
Dept: HOSPITAL 47 - RADCTMAIN | Age: 72
Discharge: HOME | End: 2022-09-13
Attending: INTERNAL MEDICINE
Payer: MEDICARE

## 2022-09-13 DIAGNOSIS — R06.2: ICD-10-CM

## 2022-09-13 DIAGNOSIS — J84.10: Primary | ICD-10-CM

## 2022-09-13 PROCEDURE — 71250 CT THORAX DX C-: CPT

## 2022-09-13 NOTE — CT
EXAMINATION TYPE: CT chest wo con

 

DATE OF EXAM: 9/13/2022

 

COMPARISON: CTA chest February 16, 2022

 

HISTORY: SOB

 

CT DLP: 221 mGycm.  Automated Exposure Control for Dose Reduction was Utilized.

 

 

TECHNIQUE:  CT scan of the thorax is performed without IV contrast. High-resolution protocol with 1 m
m sequences obtained 10 mm intervals in supine and prone technique.

 

FINDINGS: Suboptimal study as there is motion artifact degradation.

 

LUNGS: Mild peripheral reticulation and scarring in the posterior aspect of the right lower lobe is c
onfirmed. No pleural effusion or pneumothorax seen bilaterally. No pulmonary masses. No suspicious fo
tabitha consolidation.

 

MEDIASTINUM: Lack of IV contrast intact knee are noted to limit evaluation for mediastinal and especi
ally hilar adenopathy. There are no definitive greater than 1 cm mediastinal lymph nodes. Mild cardio
megaly redemonstrated. No pericardial effusion is seen. There is persistent left aortic arch with katia
rrant right brachiocephalic artery running posterior to the esophagus, normal variant. 

 

OTHER: Slight scoliotic curvature. Heterogeneous prominent thyroid gland partially imaged.

 

IMPRESSION: Mild fibrotic change posterior aspect right lower lobe

## 2022-11-03 ENCOUNTER — HOSPITAL ENCOUNTER (EMERGENCY)
Dept: HOSPITAL 47 - EC | Age: 72
Discharge: HOME | End: 2022-11-03
Payer: MEDICARE

## 2022-11-03 VITALS
TEMPERATURE: 98.5 F | HEART RATE: 77 BPM | RESPIRATION RATE: 18 BRPM | SYSTOLIC BLOOD PRESSURE: 154 MMHG | DIASTOLIC BLOOD PRESSURE: 77 MMHG

## 2022-11-03 DIAGNOSIS — Z79.51: ICD-10-CM

## 2022-11-03 DIAGNOSIS — I10: ICD-10-CM

## 2022-11-03 DIAGNOSIS — Z79.891: ICD-10-CM

## 2022-11-03 DIAGNOSIS — Z79.899: ICD-10-CM

## 2022-11-03 DIAGNOSIS — M79.651: Primary | ICD-10-CM

## 2022-11-03 DIAGNOSIS — F32.A: ICD-10-CM

## 2022-11-03 DIAGNOSIS — Z88.5: ICD-10-CM

## 2022-11-03 DIAGNOSIS — F41.9: ICD-10-CM

## 2022-11-03 DIAGNOSIS — Z79.83: ICD-10-CM

## 2022-11-03 DIAGNOSIS — E07.9: ICD-10-CM

## 2022-11-03 DIAGNOSIS — Z88.0: ICD-10-CM

## 2022-11-03 PROCEDURE — 99284 EMERGENCY DEPT VISIT MOD MDM: CPT

## 2022-11-03 PROCEDURE — 96372 THER/PROPH/DIAG INJ SC/IM: CPT

## 2022-11-03 NOTE — ED
General Adult HPI





- General


Chief complaint: Recheck/Abnormal Lab/Rx


Stated complaint: Possible DVT,Sent from PCP


Time Seen by Provider: 11/03/22 17:27


Source: patient


Mode of arrival: wheelchair





- History of Present Illness


Initial comments: 


Patient is a 71-year-old female who presents to the emergency department with a 

chief complaint of right thigh pain.  Patient was sent in by urgent care for 

rule out DVT.  Patient states pain started a few days ago when she was walking 

around in her home.  She does not recall any specific injury however her son 

reports that she does walk up and down stairs quite a bit.  Pain is in the front

and middle region of her right thigh.  There is no radiation.  No back pain.  

Taking Tylenol for pain with some relief.  She denies numbness, tingling, 

swelling, redness, weakness of the extremity.  Denies history of DVT and PE.  

Denies family history of DVT and PE.  Denies tobacco use.








- Related Data


                                Home Medications











 Medication  Instructions  Recorded  Confirmed


 


Acetaminophen Tab [Tylenol] 500 mg PO Q4H PRN MDD 6 TABS 02/16/22 02/16/22


 


Alendronate Sodium 70 mg PO Q7D 02/16/22 02/16/22


 


Calcium Carbonate/Vitamin D3 1 tab PO BID 02/16/22 02/16/22





[Calcium 500 mg-Vit D3 5 mcg (200   





Unit)]   


 


Carboxymethylcell/Glycerin/Pf 1 drop BOTH EYES QID 02/16/22 02/16/22





[Refresh Relieva Pf 0.5-1% Drop]   


 


DULoxetine HCL [Cymbalta] 60 mg PO DAILY 02/16/22 02/16/22


 


Ergocalciferol [Vitamin D2 (1250 1,250 mcg PO Q14D 02/16/22 02/16/22





Mcg = 72991 Iu)]   


 


Felodipine [Felodipine ER] 10 mg PO BID 02/16/22 02/16/22


 


Fluticasone Propion/Salmeterol 1 inhalation PO RT-BID 02/16/22 02/16/22





[Advair 100-50 Diskus]   


 


Furosemide [Lasix] 10 mg PO DAILY 02/16/22 02/16/22


 


Ipratropium/Albuter 20-100Mcg 1 puff INHALATION RT-QID PRN 02/16/22 02/16/22





[Combivent Respimat 20-100Mcg   





Inhaler]   


 


Latanoprost [Xalatan 0.005%] 1 drop BOTH EYES HS 02/16/22 02/16/22


 


Lisinopril-Hctz 20-25 mg 1 tab PO DAILY 02/16/22 02/16/22





[Zestoretic 20-25]   


 


Metoprolol Tartrate [Lopressor] 50 mg PO BID 02/16/22 02/16/22


 


Omeprazole [PriLOSEC] 40 mg PO DAILY 02/16/22 02/16/22


 


Oxybutynin Chloride [Ditropan] 5 mg PO TID 02/16/22 02/16/22


 


Potassium Chloride [Klor-Con 10 ER] 10 meq PO Q48H 02/16/22 02/16/22


 


QUEtiapine FUMARATE [SEROquel] 300 mg PO HS 02/16/22 02/16/22


 


Simvastatin [Zocor] 40 mg PO HS 02/16/22 02/16/22


 


lamoTRIgine [LaMICtal] 100 mg PO BID 02/16/22 02/16/22


 


lamoTRIgine [LaMICtal] 200 mg PO BID 02/16/22 02/16/22








                                  Previous Rx's











 Medication  Instructions  Recorded


 


Albuterol Sulfate [Albuterol 2 puff PO RT-Q8H PRN #1 inh 02/22/22





Sulfate Hfa]  


 


Cyclobenzaprine [Flexeril] 5 mg PO HS PRN #5 tablet 11/03/22


 


Ibuprofen [Motrin] 400 mg PO Q6HR PRN #20 tab 11/03/22











                                    Allergies











Allergy/AdvReac Type Severity Reaction Status Date / Time


 


paliperidone [From Invega] Allergy  swelling Verified 11/03/22 17:10





   of tongue  


 


Penicillins Allergy  Rash/Hives Verified 11/03/22 17:10














Review of Systems


ROS Statement: 


Those systems with pertinent positive or pertinent negative responses have been 

documented in the HPI.





ROS Other: All systems not noted in ROS Statement are negative.





Past Medical History


Past Medical History: Hypertension, Thyroid Disorder


Additional Past Medical History / Comment(s): home 02, pulmonary fibrosis


History of Any Multi-Drug Resistant Organisms: None Reported


Past Surgical History: Cholecystectomy, Hysterectomy


Additional Past Surgical History / Comment(s): Bladder stimulator, bilateral 

feet bunionectomies, colonoscopy


Past Anesthesia/Blood Transfusion Reactions: No Reported Reaction


Past Psychological History: Anxiety, Depression


Smoking Status: Never smoker


Past Alcohol Use History: None Reported


Past Drug Use History: None Reported





- Past Family History


  ** Father


Additional Family Medical History / Comment(s): Respiratory problems





  ** Mother


Additional Family Medical History / Comment(s): Heart problems.





General Exam


General appearance: alert, in no apparent distress


Respiratory exam: Present: normal lung sounds bilaterally.  Absent: respiratory 

distress, wheezes, rales, rhonchi, stridor


Cardiovascular Exam: Present: regular rate, normal rhythm, normal heart sounds. 

Absent: systolic murmur, diastolic murmur, rubs, gallop, clicks


Extremities exam: Present: other (Tenderness in the right anterior and medial 

distal thigh without overlying erythema, ecchymosis, swelling, deformity, 

palpable cord.  No calf tenderness)


Neurological exam: Present: alert, oriented X3, CN II-XII intact


Psychiatric exam: Present: normal affect, normal mood


Skin exam: Present: warm, dry, intact, normal color.  Absent: rash





Course


                                   Vital Signs











  11/03/22





  17:06


 


Temperature 98.5 F


 


Pulse Rate 77


 


Respiratory 18





Rate 


 


Blood Pressure 154/77


 


O2 Sat by Pulse 97





Oximetry 














Medical Decision Making





- Medical Decision Making


This is a 71-year-old female presenting with right thigh pain.








Ultrasound with Doppler right lower extremity obtained.  I reviewed this which 

was negative for DVT.  Pain likely related to musculoskeletal etiology. Patient 

given Toradol and Flexeril in the emergency department with significant 

improvement of pain.  GFR reviewed which is about 60.  I will send patient home 

with a short prescription of Motrin 400.  I will also send home with a short 

prescription of Flexeril.  We discussed risk of this medication.  She verbalizes

understanding.








Dr. Rosa is my attending.

















Disposition


Clinical Impression: 


 Right thigh pain





Disposition: HOME SELF-CARE


Condition: Good


Instructions (If sedation given, give patient instructions):  Musculoskeletal 

Pain (ED)


Additional Instructions: 


Take medication as directed.  Applyint cold or warm compresses may help pain.  

Follow-up with primary care provider in one to 2 days.  Return to the emergency 

department if you experience new, concerning, or worsening symptoms.


Prescriptions: 


Cyclobenzaprine [Flexeril] 5 mg PO HS PRN #5 tablet


 PRN Reason: Muscle Spasm


Ibuprofen [Motrin] 400 mg PO Q6HR PRN #20 tab


 PRN Reason: Pain


Is patient prescribed a controlled substance at d/c from ED?: No


Referrals: 


[facility] [Primary Care Provider] - 1-2 days


Time of Disposition: 17:32

## 2022-11-11 ENCOUNTER — HOSPITAL ENCOUNTER (EMERGENCY)
Dept: HOSPITAL 47 - EC | Age: 72
Discharge: HOME | End: 2022-11-11
Payer: MEDICARE

## 2022-11-11 VITALS — DIASTOLIC BLOOD PRESSURE: 70 MMHG | HEART RATE: 79 BPM | TEMPERATURE: 98.2 F | SYSTOLIC BLOOD PRESSURE: 154 MMHG

## 2022-11-11 VITALS — RESPIRATION RATE: 18 BRPM

## 2022-11-11 DIAGNOSIS — E07.9: ICD-10-CM

## 2022-11-11 DIAGNOSIS — S76.211A: Primary | ICD-10-CM

## 2022-11-11 DIAGNOSIS — Z79.899: ICD-10-CM
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
## 2022-11-11 DIAGNOSIS — Z88.8: ICD-10-CM

## 2022-11-11 DIAGNOSIS — F32.A: ICD-10-CM

## 2022-11-11 DIAGNOSIS — Z88.0: ICD-10-CM

## 2022-11-11 DIAGNOSIS — F41.9: ICD-10-CM

## 2022-11-11 DIAGNOSIS — I10: ICD-10-CM

## 2022-11-11 PROCEDURE — 73502 X-RAY EXAM HIP UNI 2-3 VIEWS: CPT

## 2022-11-11 PROCEDURE — 99284 EMERGENCY DEPT VISIT MOD MDM: CPT

## 2022-11-11 NOTE — XR
EXAMINATION TYPE: XR femur RT

 

DATE OF EXAM: 11/11/2022

 

COMPARISON: NONE

 

HISTORY: Obtained

 

TECHNIQUE: 4 views submitted

 

FINDINGS: There is a leads seen overlying the right iliac bone and pelvis. There is moderate to sever
e narrowing of hip joint with hypertrophic change. Fibula. Severe arthropathy of the knee joint. Ther
e is a deformity of the superior margin patella. Small amount of fluid in the suprapatellar bursa.

 

IMPRESSION: 

1. There is a defect involving the upper pole of patella which should be correlated with x-ray of the
 right knee to exclude fracture.

2. Severe osteoarthritis of the knee and hip joint. Correlate for femoral acetabular impingement.

## 2022-11-11 NOTE — ED
General Adult HPI





- General


Chief complaint: Extremity Problem,Nontraumatic


Stated complaint: rt leg pain


Time Seen by Provider: 11/11/22 13:02


Source: patient


Mode of arrival: ambulatory


Limitations: no limitations





- History of Present Illness


Initial comments: 


Dictation was produced using dragon dictation software. please excuse any 

grammatical, word or spelling errors. 











Chief Complaint: 71-year-old female presents with right groin pain





History of Present Illness: Patient is 71-year-old female she presents emergency

department for right groin pain.  Patient states she fell several days ago and 

since then has been having pain to the right inner thigh.  Swirsky whenever she 

tries to move in certain directions.  Patient able to ambulate though with a 

mild antalgic gait.








The ROS documented in this emergency department record has been reviewed and 

confirmed by me.  Those systems with pertinent positive or negative responses 

have been documented in the HPI.  All other systems are other negative and/or 

noncontributory.








PHYSICAL EXAM:


General Impression: Alert and oriented x3, not in acute distress


HEENT: Normocephalic atraumatic, extra-ocular movements intact, pupils equal and

reactive to light bilaterally, mucous membranes moist.


Cardiovascular: Heart regular rate and rhythm


Chest: Able to complete full sentences, no retractions, no tachypnea


Abdomen: abdomen soft, non-tender, non-distended, no organomegaly


Musculoskeletal: Pulses present and equal in all extremities, no peripheral 

edema


Motor:  no focal deficits noted


Neurological: CN II-XII grossly intact, no focal motor or sensory deficits noted


Skin: Intact with no visualized rashes


Psych: Normal affect and mood


Right hip: Atraumatic, no ecchymoses, pain is reproduced with the abduction and 

right hip internal rotation





ED course: 71 Year old female presents to the emergency department for right 

groin strain.  Vital Signs upon arrival are within acceptable limits.  Hip x-ray

is unremarkable.  Femur x-ray showed perhaps may be a patella fracture.  

Radiology recommends knee x-rays.  The x-rays unremarkable.








Patient observed in emergency department for 4 hours and 40 minutes.  

Reevaluated at the bedside at 2:30 PM found to be stable medical condition.  

Patient discharged advised follow-up with primary care doctor.











- Related Data


                                Home Medications











 Medication  Instructions  Recorded  Confirmed


 


Acetaminophen Tab [Tylenol] 500 mg PO Q4H PRN MDD 6 TABS 02/16/22 02/16/22


 


Alendronate Sodium 70 mg PO Q7D 02/16/22 02/16/22


 


Calcium Carbonate/Vitamin D3 1 tab PO BID 02/16/22 02/16/22





[Calcium 500 mg-Vit D3 5 mcg (200   





Unit)]   


 


Carboxymethylcell/Glycerin/Pf 1 drop BOTH EYES QID 02/16/22 02/16/22





[Refresh Relieva Pf 0.5-1% Drop]   


 


DULoxetine HCL [Cymbalta] 60 mg PO DAILY 02/16/22 02/16/22


 


Ergocalciferol [Vitamin D2 (1250 1,250 mcg PO Q14D 02/16/22 02/16/22





Mcg = 06441 Iu)]   


 


Felodipine [Felodipine ER] 10 mg PO BID 02/16/22 02/16/22


 


Fluticasone Propion/Salmeterol 1 inhalation PO RT-BID 02/16/22 02/16/22





[Advair 100-50 Diskus]   


 


Furosemide [Lasix] 10 mg PO DAILY 02/16/22 02/16/22


 


Ipratropium/Albuter 20-100Mcg 1 puff INHALATION RT-QID PRN 02/16/22 02/16/22





[Combivent Respimat 20-100Mcg   





Inhaler]   


 


Latanoprost [Xalatan 0.005%] 1 drop BOTH EYES HS 02/16/22 02/16/22


 


Lisinopril-Hctz 20-25 mg 1 tab PO DAILY 02/16/22 02/16/22





[Zestoretic 20-25]   


 


Metoprolol Tartrate [Lopressor] 50 mg PO BID 02/16/22 02/16/22


 


Omeprazole [PriLOSEC] 40 mg PO DAILY 02/16/22 02/16/22


 


Oxybutynin Chloride [Ditropan] 5 mg PO TID 02/16/22 02/16/22


 


Potassium Chloride [Klor-Con 10 ER] 10 meq PO Q48H 02/16/22 02/16/22


 


QUEtiapine FUMARATE [SEROquel] 300 mg PO HS 02/16/22 02/16/22


 


Simvastatin [Zocor] 40 mg PO HS 02/16/22 02/16/22


 


lamoTRIgine [LaMICtal] 100 mg PO BID 02/16/22 02/16/22


 


lamoTRIgine [LaMICtal] 200 mg PO BID 02/16/22 02/16/22








                                  Previous Rx's











 Medication  Instructions  Recorded


 


Albuterol Sulfate [Albuterol 2 puff PO RT-Q8H PRN #1 inh 02/22/22





Sulfate Hfa]  


 


Cyclobenzaprine [Flexeril] 5 mg PO HS PRN #5 tablet 11/03/22


 


Ibuprofen [Motrin] 400 mg PO Q6HR PRN #20 tab 11/03/22











                                    Allergies











Allergy/AdvReac Type Severity Reaction Status Date / Time


 


paliperidone [From Invega] Allergy  swelling Verified 11/11/22 09:52





   of tongue  


 


Penicillins Allergy  Rash/Hives Verified 11/11/22 09:52














Review of Systems


ROS Statement: 


Those systems with pertinent positive or pertinent negative responses have been 

documented in the HPI.





ROS Other: All systems not noted in ROS Statement are negative.





Past Medical History


Past Medical History: Hypertension, Thyroid Disorder


Additional Past Medical History / Comment(s): home 02, pulmonary fibrosis


History of Any Multi-Drug Resistant Organisms: None Reported


Past Surgical History: Cholecystectomy, Hysterectomy


Additional Past Surgical History / Comment(s): Bladder stimulator, bilateral 

feet bunionectomies, colonoscopy


Past Anesthesia/Blood Transfusion Reactions: No Reported Reaction


Past Psychological History: Anxiety, Depression


Smoking Status: Never smoker


Past Alcohol Use History: None Reported


Past Drug Use History: None Reported





- Past Family History


  ** Father


Additional Family Medical History / Comment(s): Respiratory problems





  ** Mother


Additional Family Medical History / Comment(s): Heart problems.





General Exam


Limitations: no limitations





Course


                                   Vital Signs











  11/11/22 11/11/22





  09:49 13:11


 


Temperature 98.4 F 


 


Pulse Rate 78 76


 


Respiratory 20 18





Rate  


 


Blood Pressure 132/72 134/79


 


O2 Sat by Pulse 99 100





Oximetry  














Disposition


Clinical Impression: 


 Groin strain





Disposition: HOME SELF-CARE


Condition: Good


Instructions (If sedation given, give patient instructions):  Groin Strain (ED)


Is patient prescribed a controlled substance at d/c from ED?: No


Referrals: 


[facility] [Primary Care Provider] - 1-2 days


Time of Disposition: 15:01

## 2022-11-11 NOTE — XR
EXAMINATION TYPE: XR Hip Complete RT

 

DATE OF EXAM: 11/11/2022

 

CLINICAL HISTORY: pain

 

TECHNIQUE:  AP and frogleg views of the right hip are obtained.

 

COMPARISON: None.

 

FINDINGS:  There is no acute fracture/dislocation evident.  The joint space  appears mildly narrowed.
  The overlying soft tissue appears unremarkable.

 

IMPRESSION:  

1.  There is no acute fracture or dislocation.

 

ICD 10 NO FRACTURE, INITIAL EVALUATION

## 2022-11-11 NOTE — XR
EXAMINATION TYPE: XR knee 4V RT

 

DATE OF EXAM: 11/11/2022

 

CLINICAL HISTORY: pain

 

TECHNIQUE:  Three views of the right knee are obtained.

 

COMPARISON: None.

 

FINDINGS:  There is no acute fracture/dislocation. Moderate to severe degenerative narrowing medial t
ibiofemoral joint space and patellofemoral joint space. Suprapatellar loose body noted. Stents of spu
r formation seen about the femoral condyles and tibial plateaus as well as intracondylar patellar reg
ions. The overlying soft tissue appears unremarkable.

 

IMPRESSION:  There is no acute fracture or dislocation.ICD 10 NO FRACTURE, INITIAL EVALUATION

## 2023-06-19 ENCOUNTER — HOSPITAL ENCOUNTER (OUTPATIENT)
Dept: HOSPITAL 47 - RADMAMWWP | Age: 73
Discharge: HOME | End: 2023-06-19
Attending: CLINIC/CENTER
Payer: MEDICARE

## 2023-06-19 DIAGNOSIS — Z78.0: ICD-10-CM

## 2023-06-19 DIAGNOSIS — Z12.31: Primary | ICD-10-CM

## 2023-06-19 PROCEDURE — 77063 BREAST TOMOSYNTHESIS BI: CPT

## 2023-06-19 PROCEDURE — 77067 SCR MAMMO BI INCL CAD: CPT

## 2023-06-19 NOTE — MM
Reason for Exam: Screening  (asymptomatic). 

Last mammogram was performed 1 year(s) and 11 month(s) ago. 





Patient History: 

Menarche at age 13. Patient has no children. Left ovary removed at age 39. Right ovary removed at

age 39. Hysterectomy at age 39. Postmenopausal. 





Risk Values: 

Savana 5 year model risk: 1.9%.

NCI Lifetime model risk: 5.4%.





Prior Study Comparison: 

7/1/2019 Bilateral Diagnostic Mammogram, [facility]. 7/14/2020 Bilateral Screening Mammogram, [facility].

7/16/2021 Bilateral Screening Mammogram, [facility]. 





Tissue Density: 

The breast tissue is heterogeneously dense. This may lower the sensitivity of mammography.





Findings: 

Analyzed By CAD. 

Scattered benign-appearing calcifications.

There is no suspicious group of microcalcifications or new suspicious mass in either breast. 





Overall Assessment: Benign, BI-RAD 2





Management: 

Screening Mammogram of both breasts in 1 year.

[facility] will attempt to contact patient to return for supplemental views and

ultrasound if indicated.



Patient should continue monthly self-breast exams.  A clinical breast exam by your physician is

recommended on an annual basis.

This exam should not preclude additional follow-up of suspicious palpable abnormalities.



Note on Savana scores and lifetime risk:

1. A Savana score greater than 3% is considered moderate risk. If this is the case, consider

specialist referral to assess eligibility for a risk reducing agent.

2. If overall lifetime risk for the development of breast cancer is 20% or higher, the patient may

qualify for future screening with alternating mammogram and breast MRI.



Electronically signed and approved by: Allen Min DO

## 2024-04-01 NOTE — MM
Reason for exam: follow-up at short interval from prior study.

Last mammogram was performed 9 months ago.



History:

Patient is postmenopausal and is nulliparous.



Physical Findings:

Nurse did not find any significant physical abnormalities on exam.



MG Diagnostic Mammo RT w CAD

CC and MLO view(s) were taken of the right breast.

Prior study comparison: August 2, 2017, bilateral MG work up mamm w CAD BILAT.  

July 12, 2017, bilateral MG 3d screening mammo w/cad.

Finding: There are typically benign round, grouped and diffuse/scattered 

calcifications in the right breast. There is a chronic nodularity in the right 

breast. There is no discrete abnormality.



These results were verbally communicated with the patient and result sheet given 

to the patient on 5/7/18.





ASSESSMENT: Benign, BI-RAD 2



RECOMMENDATION:

Follow-up diagnostic mammogram of both breasts in 3 months.

Back on schedule for August 2018.
[Time Spent: ___ minutes] : I have spent [unfilled] minutes of time on the encounter.